# Patient Record
Sex: FEMALE | NOT HISPANIC OR LATINO | URBAN - METROPOLITAN AREA
[De-identification: names, ages, dates, MRNs, and addresses within clinical notes are randomized per-mention and may not be internally consistent; named-entity substitution may affect disease eponyms.]

---

## 2019-03-19 ENCOUNTER — INPATIENT (INPATIENT)
Facility: HOSPITAL | Age: 59
LOS: 2 days | Discharge: HOME | End: 2019-03-22
Attending: INTERNAL MEDICINE | Admitting: INTERNAL MEDICINE
Payer: MEDICAID

## 2019-03-19 VITALS
RESPIRATION RATE: 20 BRPM | OXYGEN SATURATION: 97 % | TEMPERATURE: 99 F | SYSTOLIC BLOOD PRESSURE: 162 MMHG | HEART RATE: 148 BPM | DIASTOLIC BLOOD PRESSURE: 115 MMHG

## 2019-03-19 DIAGNOSIS — Z90.710 ACQUIRED ABSENCE OF BOTH CERVIX AND UTERUS: Chronic | ICD-10-CM

## 2019-03-19 DIAGNOSIS — A41.9 SEPSIS, UNSPECIFIED ORGANISM: ICD-10-CM

## 2019-03-19 LAB
ALBUMIN SERPL ELPH-MCNC: 4.8 G/DL — SIGNIFICANT CHANGE UP (ref 3.3–5)
ALP SERPL-CCNC: 160 U/L — HIGH (ref 40–120)
ALT FLD-CCNC: 20 U/L — SIGNIFICANT CHANGE UP (ref 4–33)
ANION GAP SERPL CALC-SCNC: 17 MMO/L — HIGH (ref 7–14)
APTT BLD: 30.1 SEC — SIGNIFICANT CHANGE UP (ref 27.5–36.3)
AST SERPL-CCNC: 25 U/L — SIGNIFICANT CHANGE UP (ref 4–32)
B PERT DNA SPEC QL NAA+PROBE: NOT DETECTED — SIGNIFICANT CHANGE UP
BASE EXCESS BLDV CALC-SCNC: 1.7 MMOL/L — SIGNIFICANT CHANGE UP
BASOPHILS # BLD AUTO: 0.08 K/UL — SIGNIFICANT CHANGE UP (ref 0–0.2)
BASOPHILS NFR BLD AUTO: 0.3 % — SIGNIFICANT CHANGE UP (ref 0–2)
BASOPHILS NFR SPEC: 0 % — SIGNIFICANT CHANGE UP (ref 0–2)
BILIRUB SERPL-MCNC: 0.6 MG/DL — SIGNIFICANT CHANGE UP (ref 0.2–1.2)
BLASTS # FLD: 0 % — SIGNIFICANT CHANGE UP (ref 0–0)
BLOOD GAS VENOUS - CREATININE: 0.66 MG/DL — SIGNIFICANT CHANGE UP (ref 0.5–1.3)
BUN SERPL-MCNC: 14 MG/DL — SIGNIFICANT CHANGE UP (ref 7–23)
C PNEUM DNA SPEC QL NAA+PROBE: NOT DETECTED — SIGNIFICANT CHANGE UP
CALCIUM SERPL-MCNC: 10.2 MG/DL — SIGNIFICANT CHANGE UP (ref 8.4–10.5)
CHLORIDE BLDV-SCNC: 98 MMOL/L — SIGNIFICANT CHANGE UP (ref 96–108)
CHLORIDE SERPL-SCNC: 95 MMOL/L — LOW (ref 98–107)
CK MB BLD-MCNC: < 1 NG/ML — LOW (ref 1–4.7)
CK MB BLD-MCNC: SIGNIFICANT CHANGE UP (ref 0–2.5)
CK SERPL-CCNC: 69 U/L — SIGNIFICANT CHANGE UP (ref 25–170)
CO2 SERPL-SCNC: 24 MMOL/L — SIGNIFICANT CHANGE UP (ref 22–31)
CREAT SERPL-MCNC: 0.73 MG/DL — SIGNIFICANT CHANGE UP (ref 0.5–1.3)
EOSINOPHIL # BLD AUTO: 0 K/UL — SIGNIFICANT CHANGE UP (ref 0–0.5)
EOSINOPHIL NFR BLD AUTO: 0 % — SIGNIFICANT CHANGE UP (ref 0–6)
EOSINOPHIL NFR FLD: 0 % — SIGNIFICANT CHANGE UP (ref 0–6)
FLUAV H1 2009 PAND RNA SPEC QL NAA+PROBE: NOT DETECTED — SIGNIFICANT CHANGE UP
FLUAV H1 RNA SPEC QL NAA+PROBE: NOT DETECTED — SIGNIFICANT CHANGE UP
FLUAV H3 RNA SPEC QL NAA+PROBE: NOT DETECTED — SIGNIFICANT CHANGE UP
FLUAV SUBTYP SPEC NAA+PROBE: NOT DETECTED — SIGNIFICANT CHANGE UP
FLUBV RNA SPEC QL NAA+PROBE: NOT DETECTED — SIGNIFICANT CHANGE UP
GAS PNL BLDV: 136 MMOL/L — SIGNIFICANT CHANGE UP (ref 136–146)
GLUCOSE BLDV-MCNC: 117 — HIGH (ref 70–99)
GLUCOSE SERPL-MCNC: 118 MG/DL — HIGH (ref 70–99)
HADV DNA SPEC QL NAA+PROBE: NOT DETECTED — SIGNIFICANT CHANGE UP
HCO3 BLDV-SCNC: 25 MMOL/L — SIGNIFICANT CHANGE UP (ref 20–27)
HCOV PNL SPEC NAA+PROBE: SIGNIFICANT CHANGE UP
HCT VFR BLD CALC: 42.9 % — SIGNIFICANT CHANGE UP (ref 34.5–45)
HCT VFR BLDV CALC: 42.6 % — SIGNIFICANT CHANGE UP (ref 34.5–45)
HGB BLD-MCNC: 13.4 G/DL — SIGNIFICANT CHANGE UP (ref 11.5–15.5)
HGB BLDV-MCNC: 13.9 G/DL — SIGNIFICANT CHANGE UP (ref 11.5–15.5)
HMPV RNA SPEC QL NAA+PROBE: NOT DETECTED — SIGNIFICANT CHANGE UP
HPIV1 RNA SPEC QL NAA+PROBE: NOT DETECTED — SIGNIFICANT CHANGE UP
HPIV2 RNA SPEC QL NAA+PROBE: NOT DETECTED — SIGNIFICANT CHANGE UP
HPIV3 RNA SPEC QL NAA+PROBE: NOT DETECTED — SIGNIFICANT CHANGE UP
HPIV4 RNA SPEC QL NAA+PROBE: NOT DETECTED — SIGNIFICANT CHANGE UP
IMM GRANULOCYTES NFR BLD AUTO: 0.8 % — SIGNIFICANT CHANGE UP (ref 0–1.5)
INR BLD: 1.18 — HIGH (ref 0.88–1.17)
LACTATE BLDV-MCNC: 3.6 MMOL/L — HIGH (ref 0.5–2)
LYMPHOCYTES # BLD AUTO: 1.62 K/UL — SIGNIFICANT CHANGE UP (ref 1–3.3)
LYMPHOCYTES # BLD AUTO: 6.5 % — LOW (ref 13–44)
LYMPHOCYTES NFR SPEC AUTO: 3.5 % — LOW (ref 13–44)
MANUAL SMEAR VERIFICATION: SIGNIFICANT CHANGE UP
MCHC RBC-ENTMCNC: 23.8 PG — LOW (ref 27–34)
MCHC RBC-ENTMCNC: 31.2 % — LOW (ref 32–36)
MCV RBC AUTO: 76.2 FL — LOW (ref 80–100)
METAMYELOCYTES # FLD: 0 % — SIGNIFICANT CHANGE UP (ref 0–1)
MONOCYTES # BLD AUTO: 1.38 K/UL — HIGH (ref 0–0.9)
MONOCYTES NFR BLD AUTO: 5.5 % — SIGNIFICANT CHANGE UP (ref 2–14)
MONOCYTES NFR BLD: 4.3 % — SIGNIFICANT CHANGE UP (ref 2–9)
MORPHOLOGY BLD-IMP: NORMAL — SIGNIFICANT CHANGE UP
MYELOCYTES NFR BLD: 0 % — SIGNIFICANT CHANGE UP (ref 0–0)
NEUTROPHIL AB SER-ACNC: 85.3 % — HIGH (ref 43–77)
NEUTROPHILS # BLD AUTO: 21.64 K/UL — HIGH (ref 1.8–7.4)
NEUTROPHILS NFR BLD AUTO: 86.9 % — HIGH (ref 43–77)
NEUTS BAND # BLD: 3.5 % — SIGNIFICANT CHANGE UP (ref 0–6)
NRBC # FLD: 0 K/UL — LOW (ref 25–125)
OTHER - HEMATOLOGY %: 0 — SIGNIFICANT CHANGE UP
PCO2 BLDV: 40 MMHG — LOW (ref 41–51)
PH BLDV: 7.42 PH — SIGNIFICANT CHANGE UP (ref 7.32–7.43)
PLATELET # BLD AUTO: 422 K/UL — HIGH (ref 150–400)
PLATELET COUNT - ESTIMATE: NORMAL — SIGNIFICANT CHANGE UP
PMV BLD: 10.3 FL — SIGNIFICANT CHANGE UP (ref 7–13)
PO2 BLDV: 40 MMHG — SIGNIFICANT CHANGE UP (ref 35–40)
POTASSIUM BLDV-SCNC: 5.7 MMOL/L — HIGH (ref 3.4–4.5)
POTASSIUM SERPL-MCNC: 3.7 MMOL/L — SIGNIFICANT CHANGE UP (ref 3.5–5.3)
POTASSIUM SERPL-SCNC: 3.7 MMOL/L — SIGNIFICANT CHANGE UP (ref 3.5–5.3)
PROMYELOCYTES # FLD: 0 % — SIGNIFICANT CHANGE UP (ref 0–0)
PROT SERPL-MCNC: 9.3 G/DL — HIGH (ref 6–8.3)
PROTHROM AB SERPL-ACNC: 13.2 SEC — HIGH (ref 9.8–13.1)
RBC # BLD: 5.63 M/UL — HIGH (ref 3.8–5.2)
RBC # FLD: 16.5 % — HIGH (ref 10.3–14.5)
RSV RNA SPEC QL NAA+PROBE: NOT DETECTED — SIGNIFICANT CHANGE UP
RV+EV RNA SPEC QL NAA+PROBE: NOT DETECTED — SIGNIFICANT CHANGE UP
SAO2 % BLDV: 75.9 % — SIGNIFICANT CHANGE UP (ref 60–85)
SODIUM SERPL-SCNC: 136 MMOL/L — SIGNIFICANT CHANGE UP (ref 135–145)
TROPONIN T, HIGH SENSITIVITY: < 6 NG/L — SIGNIFICANT CHANGE UP (ref ?–14)
VARIANT LYMPHS # BLD: 3.4 % — SIGNIFICANT CHANGE UP
WBC # BLD: 24.91 K/UL — HIGH (ref 3.8–10.5)
WBC # FLD AUTO: 24.91 K/UL — HIGH (ref 3.8–10.5)

## 2019-03-19 PROCEDURE — 71045 X-RAY EXAM CHEST 1 VIEW: CPT | Mod: 26

## 2019-03-19 RX ORDER — HEPARIN SODIUM 5000 [USP'U]/ML
5000 INJECTION INTRAVENOUS; SUBCUTANEOUS EVERY 12 HOURS
Qty: 0 | Refills: 0 | Status: DISCONTINUED | OUTPATIENT
Start: 2019-03-19 | End: 2019-03-22

## 2019-03-19 RX ORDER — SODIUM CHLORIDE 9 MG/ML
2400 INJECTION INTRAMUSCULAR; INTRAVENOUS; SUBCUTANEOUS ONCE
Qty: 0 | Refills: 0 | Status: COMPLETED | OUTPATIENT
Start: 2019-03-19 | End: 2019-03-19

## 2019-03-19 RX ORDER — VANCOMYCIN HCL 1 G
1000 VIAL (EA) INTRAVENOUS ONCE
Qty: 0 | Refills: 0 | Status: COMPLETED | OUTPATIENT
Start: 2019-03-19 | End: 2019-03-19

## 2019-03-19 RX ORDER — ACETAMINOPHEN 500 MG
1000 TABLET ORAL ONCE
Qty: 0 | Refills: 0 | Status: COMPLETED | OUTPATIENT
Start: 2019-03-19 | End: 2019-03-19

## 2019-03-19 RX ORDER — ONDANSETRON 8 MG/1
4 TABLET, FILM COATED ORAL ONCE
Qty: 0 | Refills: 0 | Status: COMPLETED | OUTPATIENT
Start: 2019-03-19 | End: 2019-03-19

## 2019-03-19 RX ORDER — VANCOMYCIN HCL 1 G
1000 VIAL (EA) INTRAVENOUS EVERY 12 HOURS
Qty: 0 | Refills: 0 | Status: DISCONTINUED | OUTPATIENT
Start: 2019-03-20 | End: 2019-03-21

## 2019-03-19 RX ORDER — PIPERACILLIN AND TAZOBACTAM 4; .5 G/20ML; G/20ML
3.38 INJECTION, POWDER, LYOPHILIZED, FOR SOLUTION INTRAVENOUS ONCE
Qty: 0 | Refills: 0 | Status: COMPLETED | OUTPATIENT
Start: 2019-03-19 | End: 2019-03-19

## 2019-03-19 RX ORDER — SODIUM CHLORIDE 9 MG/ML
1000 INJECTION INTRAMUSCULAR; INTRAVENOUS; SUBCUTANEOUS
Qty: 0 | Refills: 0 | Status: DISCONTINUED | OUTPATIENT
Start: 2019-03-19 | End: 2019-03-22

## 2019-03-19 RX ORDER — ACETAMINOPHEN 500 MG
975 TABLET ORAL ONCE
Qty: 0 | Refills: 0 | Status: COMPLETED | OUTPATIENT
Start: 2019-03-19 | End: 2019-03-19

## 2019-03-19 RX ORDER — SIMVASTATIN 20 MG/1
1 TABLET, FILM COATED ORAL
Qty: 0 | Refills: 0 | COMMUNITY

## 2019-03-19 RX ORDER — ALBUTEROL 90 UG/1
2 AEROSOL, METERED ORAL EVERY 6 HOURS
Qty: 0 | Refills: 0 | Status: DISCONTINUED | OUTPATIENT
Start: 2019-03-19 | End: 2019-03-22

## 2019-03-19 RX ORDER — CARVEDILOL PHOSPHATE 80 MG/1
25 CAPSULE, EXTENDED RELEASE ORAL EVERY 12 HOURS
Qty: 0 | Refills: 0 | Status: DISCONTINUED | OUTPATIENT
Start: 2019-03-19 | End: 2019-03-19

## 2019-03-19 RX ORDER — ALBUTEROL 90 UG/1
2 AEROSOL, METERED ORAL
Qty: 0 | Refills: 0 | COMMUNITY

## 2019-03-19 RX ORDER — PANTOPRAZOLE SODIUM 20 MG/1
1 TABLET, DELAYED RELEASE ORAL
Qty: 0 | Refills: 0 | COMMUNITY

## 2019-03-19 RX ORDER — FAMOTIDINE 10 MG/ML
20 INJECTION INTRAVENOUS
Qty: 0 | Refills: 0 | Status: DISCONTINUED | OUTPATIENT
Start: 2019-03-19 | End: 2019-03-19

## 2019-03-19 RX ORDER — ACETAMINOPHEN 500 MG
650 TABLET ORAL ONCE
Qty: 0 | Refills: 0 | Status: COMPLETED | OUTPATIENT
Start: 2019-03-19 | End: 2019-03-19

## 2019-03-19 RX ORDER — METOPROLOL TARTRATE 50 MG
1 TABLET ORAL
Qty: 0 | Refills: 0 | COMMUNITY

## 2019-03-19 RX ORDER — SIMVASTATIN 20 MG/1
10 TABLET, FILM COATED ORAL AT BEDTIME
Qty: 0 | Refills: 0 | Status: DISCONTINUED | OUTPATIENT
Start: 2019-03-19 | End: 2019-03-22

## 2019-03-19 RX ORDER — ACETAMINOPHEN 500 MG
650 TABLET ORAL EVERY 6 HOURS
Qty: 0 | Refills: 0 | Status: DISCONTINUED | OUTPATIENT
Start: 2019-03-19 | End: 2019-03-22

## 2019-03-19 RX ORDER — VANCOMYCIN HCL 1 G
VIAL (EA) INTRAVENOUS
Qty: 0 | Refills: 0 | Status: DISCONTINUED | OUTPATIENT
Start: 2019-03-19 | End: 2019-03-19

## 2019-03-19 RX ADMIN — Medication 650 MILLIGRAM(S): at 20:13

## 2019-03-19 RX ADMIN — PIPERACILLIN AND TAZOBACTAM 200 GRAM(S): 4; .5 INJECTION, POWDER, LYOPHILIZED, FOR SOLUTION INTRAVENOUS at 16:11

## 2019-03-19 RX ADMIN — ALBUTEROL 2 PUFF(S): 90 AEROSOL, METERED ORAL at 19:17

## 2019-03-19 RX ADMIN — Medication 975 MILLIGRAM(S): at 18:23

## 2019-03-19 RX ADMIN — ONDANSETRON 4 MILLIGRAM(S): 8 TABLET, FILM COATED ORAL at 22:32

## 2019-03-19 RX ADMIN — FAMOTIDINE 20 MILLIGRAM(S): 10 INJECTION INTRAVENOUS at 18:47

## 2019-03-19 RX ADMIN — Medication 250 MILLIGRAM(S): at 14:53

## 2019-03-19 RX ADMIN — Medication 975 MILLIGRAM(S): at 14:40

## 2019-03-19 RX ADMIN — SODIUM CHLORIDE 75 MILLILITER(S): 9 INJECTION INTRAMUSCULAR; INTRAVENOUS; SUBCUTANEOUS at 19:01

## 2019-03-19 RX ADMIN — Medication 650 MILLIGRAM(S): at 18:32

## 2019-03-19 RX ADMIN — SODIUM CHLORIDE 2400 MILLILITER(S): 9 INJECTION INTRAMUSCULAR; INTRAVENOUS; SUBCUTANEOUS at 14:14

## 2019-03-19 NOTE — ED PROVIDER NOTE - PROGRESS NOTE DETAILS
Scott att: EKG signed by me  with st depression v5 v6, suspect demand ischemia. Called charge RN to expedite room placement. Slotted for room 10. PHILLIP notified concern for demand ischemia.

## 2019-03-19 NOTE — ED ADULT NURSE NOTE - NSIMPLEMENTINTERV_GEN_ALL_ED
Implemented All Fall Risk Interventions:  [place] to call system. Call bell, personal items and telephone within reach. Instruct patient to call for assistance. Room bathroom lighting operational. Non-slip footwear when patient is off stretcher. Physically safe environment: no spills, clutter or unnecessary equipment. Stretcher in lowest position, wheels locked, appropriate side rails in place. Provide visual cue, wrist band, yellow gown, etc. Monitor gait and stability. Monitor for mental status changes and reorient to person, place, and time. Review medications for side effects contributing to fall risk. Reinforce activity limits and safety measures with patient and family.

## 2019-03-19 NOTE — ED PROVIDER NOTE - OBJECTIVE STATEMENT
58y female with PMH of HTN, HLD presenting with SOB.  Started about a month ago while in [place], she was treated with an unknown antibiotic, she had some improvement but she was still short of breath.  Return to [place] on the 8th, she saw her PMD a few days ago, was treated with asthma pump and cough syrup.  Yesterday the SOB worsened, she started coughing and she had nausea and vomiting, with fever.  Vomited last this morning after trying to eat.  Denies chest pain, hemoptysis, abdominal pain, rash.  No smoking.

## 2019-03-19 NOTE — H&P ADULT - NSICDXPASTMEDICALHX_GEN_ALL_CORE_FT
PAST MEDICAL HISTORY:  Benign Essential Hypertension On Coreg    Chronic Back Pain     Dyslipidemia On Zocor

## 2019-03-19 NOTE — ED ADULT NURSE REASSESSMENT NOTE - NS ED NURSE REASSESS COMMENT FT1
Pt arrives with sob tachypneic with fever tylenol given ,pt tachycardiac on monitor pts b/p elevated per family has not had medication.  iv placed labs ,fluid and antibiotics started. Pt awaiting further evaluation.

## 2019-03-19 NOTE — H&P ADULT - NSICDXPROBLEM_GEN_ALL_CORE_FT
PROBLEM DIAGNOSES  Problem: Sepsis  Assessment and Plan: Tachycardic, febrile, elevated WBC. Sourse unknown. ID eval called. Cultures and CT of C/A/P ordered

## 2019-03-19 NOTE — H&P ADULT - HISTORY OF PRESENT ILLNESS
58y female with PMH of HTN, HLD presenting with SOB.  Started about a month ago while in [place], she was treated with an unknown antibiotic, she had some improvement but she was still short of breath.  [place] on [place]th, she saw this MD a few days ago, was treated with asthma pump and cough syrup.  Yesterday the SOB worsened, she started coughing and she had nausea and vomiting, with fever.  Vomited last this morning after trying to eat.  Denies chest pain, hemoptysis, abdominal pain, rash.  No smoking.  She has a history of pulm TB, treated in the remote past

## 2019-03-19 NOTE — ED PROVIDER NOTE - CLINICAL SUMMARY MEDICAL DECISION MAKING FREE TEXT BOX
58y female presenting with SOB, tachycardia.  Concern for viral respiratory infection or pneumonia, considering PE if infectious etiology not apparent.  Will get sepsis labs, start fluids and empiric abx, cxr, tylenol and reassess.

## 2019-03-19 NOTE — ED PROVIDER NOTE - ATTENDING CONTRIBUTION TO CARE
58F c/o fever, cough, dyspnea  -as patient has multiple sirs criteria and I have high susp for respiratory infection, patient is septic  -agree with Dr. Gauthier that labs, cultures, iv abx, ivf, acetaminophen are indicated  -cxr, ua, urine culture  -unlikely pe as wells score only 1.5  -I spoke with Dr. Freeman, the patient's PMD, discussed the case with him and he accepted the admission onto his service

## 2019-03-19 NOTE — ED PROVIDER NOTE - CHIEF COMPLAINT
The patient is a 58y Female complaining of multiple medical complaints. The patient is a 58y Female complaining of dyspnea

## 2019-03-19 NOTE — ED ADULT NURSE NOTE - OBJECTIVE STATEMENT
Report received from Lashanda LEW. Pt is a 58 year old female reporting to the ED for SOB. Pt reports SOB for 1 month. Pt has pmh of TB. Pt reports she had recent travel to [place] and returned home march 8th.  Pt reports n/v, cough and chills. Pt is tachycardia and febrile on arrival. PT is AOX4. Pt denies chest pain. Pt reports SOB. Pt coughing, no productive. Pt placed on airborne precaution. Pt respirations are tachypneic. Pt received medication as ordered. Iv placed by Lashanda LEW. Will continue to monitor

## 2019-03-19 NOTE — ED ADULT TRIAGE NOTE - CHIEF COMPLAINT QUOTE
pt amb to triage c/o fever, body aches, N/V since last night, as per daughter states in [place] last week dx w/ "infection" unknown, did not receive any medication, presents from PMD, advised to come to ED for further eval

## 2019-03-19 NOTE — ED ADULT NURSE NOTE - NSSUHOSCREENINGYN_ED_ALL_ED
Patient (s)  given copy of dc instructions and 2 script(s). Patient (s)  verbalized understanding of instructions and script (s). Patient given a current medication reconciliation form and verbalized understanding of their medications. Patient (s) verbalized understanding of the importance of discussing medications with  his or her physician or clinic they will be following up with. Patient alert and oriented and in no acute distress. Patient discharged home ambulatory with self. Yes - the patient is able to be screened

## 2019-03-20 DIAGNOSIS — I10 ESSENTIAL (PRIMARY) HYPERTENSION: ICD-10-CM

## 2019-03-20 DIAGNOSIS — A41.9 SEPSIS, UNSPECIFIED ORGANISM: ICD-10-CM

## 2019-03-20 LAB
ALBUMIN SERPL ELPH-MCNC: 3.5 G/DL — SIGNIFICANT CHANGE UP (ref 3.3–5)
ALP SERPL-CCNC: 117 U/L — SIGNIFICANT CHANGE UP (ref 40–120)
ALT FLD-CCNC: 14 U/L — SIGNIFICANT CHANGE UP (ref 4–33)
ANION GAP SERPL CALC-SCNC: 12 MMO/L — SIGNIFICANT CHANGE UP (ref 7–14)
APPEARANCE UR: CLEAR — SIGNIFICANT CHANGE UP
AST SERPL-CCNC: 17 U/L — SIGNIFICANT CHANGE UP (ref 4–32)
BACTERIA # UR AUTO: NEGATIVE — SIGNIFICANT CHANGE UP
BASOPHILS # BLD AUTO: 0.09 K/UL — SIGNIFICANT CHANGE UP (ref 0–0.2)
BASOPHILS NFR BLD AUTO: 0.4 % — SIGNIFICANT CHANGE UP (ref 0–2)
BILIRUB SERPL-MCNC: 0.6 MG/DL — SIGNIFICANT CHANGE UP (ref 0.2–1.2)
BILIRUB UR-MCNC: NEGATIVE — SIGNIFICANT CHANGE UP
BLOOD UR QL VISUAL: NEGATIVE — SIGNIFICANT CHANGE UP
BUN SERPL-MCNC: 8 MG/DL — SIGNIFICANT CHANGE UP (ref 7–23)
CALCIUM SERPL-MCNC: 8.7 MG/DL — SIGNIFICANT CHANGE UP (ref 8.4–10.5)
CHLORIDE SERPL-SCNC: 101 MMOL/L — SIGNIFICANT CHANGE UP (ref 98–107)
CO2 SERPL-SCNC: 24 MMOL/L — SIGNIFICANT CHANGE UP (ref 22–31)
COLOR SPEC: SIGNIFICANT CHANGE UP
CREAT SERPL-MCNC: 0.67 MG/DL — SIGNIFICANT CHANGE UP (ref 0.5–1.3)
EOSINOPHIL # BLD AUTO: 0.01 K/UL — SIGNIFICANT CHANGE UP (ref 0–0.5)
EOSINOPHIL NFR BLD AUTO: 0 % — SIGNIFICANT CHANGE UP (ref 0–6)
GLUCOSE SERPL-MCNC: 113 MG/DL — HIGH (ref 70–99)
GLUCOSE UR-MCNC: NEGATIVE — SIGNIFICANT CHANGE UP
HCT VFR BLD CALC: 36.6 % — SIGNIFICANT CHANGE UP (ref 34.5–45)
HGB BLD-MCNC: 11.2 G/DL — LOW (ref 11.5–15.5)
HYALINE CASTS # UR AUTO: NEGATIVE — SIGNIFICANT CHANGE UP
IMM GRANULOCYTES NFR BLD AUTO: 0.9 % — SIGNIFICANT CHANGE UP (ref 0–1.5)
KETONES UR-MCNC: NEGATIVE — SIGNIFICANT CHANGE UP
LEUKOCYTE ESTERASE UR-ACNC: NEGATIVE — SIGNIFICANT CHANGE UP
LYMPHOCYTES # BLD AUTO: 12.5 % — LOW (ref 13–44)
LYMPHOCYTES # BLD AUTO: 2.97 K/UL — SIGNIFICANT CHANGE UP (ref 1–3.3)
MCHC RBC-ENTMCNC: 23.7 PG — LOW (ref 27–34)
MCHC RBC-ENTMCNC: 30.6 % — LOW (ref 32–36)
MCV RBC AUTO: 77.4 FL — LOW (ref 80–100)
MONOCYTES # BLD AUTO: 1.38 K/UL — HIGH (ref 0–0.9)
MONOCYTES NFR BLD AUTO: 5.8 % — SIGNIFICANT CHANGE UP (ref 2–14)
NEUTROPHILS # BLD AUTO: 19.16 K/UL — HIGH (ref 1.8–7.4)
NEUTROPHILS NFR BLD AUTO: 80.4 % — HIGH (ref 43–77)
NITRITE UR-MCNC: NEGATIVE — SIGNIFICANT CHANGE UP
NRBC # FLD: 0 K/UL — LOW (ref 25–125)
PH UR: 7 — SIGNIFICANT CHANGE UP (ref 5–8)
PLATELET # BLD AUTO: 330 K/UL — SIGNIFICANT CHANGE UP (ref 150–400)
PMV BLD: 10.1 FL — SIGNIFICANT CHANGE UP (ref 7–13)
POTASSIUM SERPL-MCNC: 3 MMOL/L — LOW (ref 3.5–5.3)
POTASSIUM SERPL-SCNC: 3 MMOL/L — LOW (ref 3.5–5.3)
PROT SERPL-MCNC: 7.7 G/DL — SIGNIFICANT CHANGE UP (ref 6–8.3)
PROT UR-MCNC: 30 — SIGNIFICANT CHANGE UP
RBC # BLD: 4.73 M/UL — SIGNIFICANT CHANGE UP (ref 3.8–5.2)
RBC # FLD: 16.7 % — HIGH (ref 10.3–14.5)
RBC CASTS # UR COMP ASSIST: SIGNIFICANT CHANGE UP (ref 0–?)
SODIUM SERPL-SCNC: 137 MMOL/L — SIGNIFICANT CHANGE UP (ref 135–145)
SP GR SPEC: 1.02 — SIGNIFICANT CHANGE UP (ref 1–1.04)
SPECIMEN SOURCE: SIGNIFICANT CHANGE UP
SPECIMEN SOURCE: SIGNIFICANT CHANGE UP
SQUAMOUS # UR AUTO: SIGNIFICANT CHANGE UP
T3 SERPL-MCNC: 64.9 NG/DL — LOW (ref 80–200)
T4 AB SER-ACNC: 6.42 UG/DL — SIGNIFICANT CHANGE UP (ref 5.1–13)
TSH SERPL-MCNC: 0.63 UIU/ML — SIGNIFICANT CHANGE UP (ref 0.27–4.2)
UROBILINOGEN FLD QL: NORMAL — SIGNIFICANT CHANGE UP
WBC # BLD: 23.82 K/UL — HIGH (ref 3.8–10.5)
WBC # FLD AUTO: 23.82 K/UL — HIGH (ref 3.8–10.5)
WBC UR QL: SIGNIFICANT CHANGE UP (ref 0–?)

## 2019-03-20 PROCEDURE — 70491 CT SOFT TISSUE NECK W/DYE: CPT | Mod: 26

## 2019-03-20 PROCEDURE — 74177 CT ABD & PELVIS W/CONTRAST: CPT | Mod: 26

## 2019-03-20 PROCEDURE — 71275 CT ANGIOGRAPHY CHEST: CPT | Mod: 26

## 2019-03-20 RX ORDER — IBUPROFEN 200 MG
600 TABLET ORAL ONCE
Qty: 0 | Refills: 0 | Status: COMPLETED | OUTPATIENT
Start: 2019-03-20 | End: 2019-03-20

## 2019-03-20 RX ORDER — POTASSIUM CHLORIDE 20 MEQ
10 PACKET (EA) ORAL
Qty: 0 | Refills: 0 | Status: COMPLETED | OUTPATIENT
Start: 2019-03-20 | End: 2019-03-20

## 2019-03-20 RX ORDER — AZITHROMYCIN 500 MG/1
TABLET, FILM COATED ORAL
Qty: 0 | Refills: 0 | Status: DISCONTINUED | OUTPATIENT
Start: 2019-03-20 | End: 2019-03-21

## 2019-03-20 RX ORDER — AZITHROMYCIN 500 MG/1
500 TABLET, FILM COATED ORAL EVERY 24 HOURS
Qty: 0 | Refills: 0 | Status: DISCONTINUED | OUTPATIENT
Start: 2019-03-21 | End: 2019-03-21

## 2019-03-20 RX ORDER — LANOLIN ALCOHOL/MO/W.PET/CERES
5 CREAM (GRAM) TOPICAL AT BEDTIME
Qty: 0 | Refills: 0 | Status: DISCONTINUED | OUTPATIENT
Start: 2019-03-20 | End: 2019-03-20

## 2019-03-20 RX ORDER — AZITHROMYCIN 500 MG/1
500 TABLET, FILM COATED ORAL ONCE
Qty: 0 | Refills: 0 | Status: COMPLETED | OUTPATIENT
Start: 2019-03-20 | End: 2019-03-20

## 2019-03-20 RX ORDER — DEXAMETHASONE 0.5 MG/5ML
4 ELIXIR ORAL ONCE
Qty: 0 | Refills: 0 | Status: COMPLETED | OUTPATIENT
Start: 2019-03-20 | End: 2019-03-20

## 2019-03-20 RX ORDER — PIPERACILLIN AND TAZOBACTAM 4; .5 G/20ML; G/20ML
3.38 INJECTION, POWDER, LYOPHILIZED, FOR SOLUTION INTRAVENOUS EVERY 8 HOURS
Qty: 0 | Refills: 0 | Status: DISCONTINUED | OUTPATIENT
Start: 2019-03-20 | End: 2019-03-22

## 2019-03-20 RX ORDER — LANOLIN ALCOHOL/MO/W.PET/CERES
3 CREAM (GRAM) TOPICAL AT BEDTIME
Qty: 0 | Refills: 0 | Status: DISCONTINUED | OUTPATIENT
Start: 2019-03-20 | End: 2019-03-22

## 2019-03-20 RX ADMIN — Medication 600 MILLIGRAM(S): at 01:33

## 2019-03-20 RX ADMIN — Medication 250 MILLIGRAM(S): at 15:24

## 2019-03-20 RX ADMIN — HEPARIN SODIUM 5000 UNIT(S): 5000 INJECTION INTRAVENOUS; SUBCUTANEOUS at 05:03

## 2019-03-20 RX ADMIN — Medication 250 MILLIGRAM(S): at 00:11

## 2019-03-20 RX ADMIN — PIPERACILLIN AND TAZOBACTAM 25 GRAM(S): 4; .5 INJECTION, POWDER, LYOPHILIZED, FOR SOLUTION INTRAVENOUS at 02:03

## 2019-03-20 RX ADMIN — SODIUM CHLORIDE 75 MILLILITER(S): 9 INJECTION INTRAMUSCULAR; INTRAVENOUS; SUBCUTANEOUS at 13:55

## 2019-03-20 RX ADMIN — SIMVASTATIN 10 MILLIGRAM(S): 20 TABLET, FILM COATED ORAL at 22:39

## 2019-03-20 RX ADMIN — PIPERACILLIN AND TAZOBACTAM 25 GRAM(S): 4; .5 INJECTION, POWDER, LYOPHILIZED, FOR SOLUTION INTRAVENOUS at 10:28

## 2019-03-20 RX ADMIN — Medication 100 MILLIGRAM(S): at 05:03

## 2019-03-20 RX ADMIN — Medication 4 MILLIGRAM(S): at 13:55

## 2019-03-20 RX ADMIN — Medication 3 MILLIGRAM(S): at 22:39

## 2019-03-20 RX ADMIN — Medication 100 MILLIGRAM(S): at 13:55

## 2019-03-20 RX ADMIN — Medication 100 MILLIEQUIVALENT(S): at 17:53

## 2019-03-20 RX ADMIN — Medication 100 MILLIEQUIVALENT(S): at 16:46

## 2019-03-20 RX ADMIN — HEPARIN SODIUM 5000 UNIT(S): 5000 INJECTION INTRAVENOUS; SUBCUTANEOUS at 17:53

## 2019-03-20 RX ADMIN — Medication 100 MILLIEQUIVALENT(S): at 19:51

## 2019-03-20 RX ADMIN — Medication 650 MILLIGRAM(S): at 15:41

## 2019-03-20 RX ADMIN — AZITHROMYCIN 250 MILLIGRAM(S): 500 TABLET, FILM COATED ORAL at 11:30

## 2019-03-20 RX ADMIN — Medication 100 MILLIGRAM(S): at 22:39

## 2019-03-20 RX ADMIN — Medication 400 MILLIGRAM(S): at 00:12

## 2019-03-20 RX ADMIN — Medication 1000 MILLIGRAM(S): at 00:12

## 2019-03-20 RX ADMIN — PIPERACILLIN AND TAZOBACTAM 25 GRAM(S): 4; .5 INJECTION, POWDER, LYOPHILIZED, FOR SOLUTION INTRAVENOUS at 17:53

## 2019-03-20 RX ADMIN — Medication 650 MILLIGRAM(S): at 14:22

## 2019-03-20 RX ADMIN — PIPERACILLIN AND TAZOBACTAM 25 GRAM(S): 4; .5 INJECTION, POWDER, LYOPHILIZED, FOR SOLUTION INTRAVENOUS at 22:39

## 2019-03-20 NOTE — CHART NOTE - NSCHARTNOTEFT_GEN_A_CORE
ENT consult called for pt with difficulty swallowing secretions, pain , submandibular nodes .  ENT will see patient , requesting CT neck with contrast

## 2019-03-20 NOTE — CONSULT NOTE ADULT - SUBJECTIVE AND OBJECTIVE BOX
Patient is a 58y old  Female who presents with a chief complaint of sepsis/fever (20 Mar 2019 09:57)      HPI:  58y female with PMH of HTN, HLD presenting with SOB.  Started about a month ago while in [place], she was treated with an unknown antibiotic, she had some improvement but she was still short of breath.  [place] on [place], she saw this MD a few days ago, was treated with asthma pump and cough syrup.  Yesterday the SOB worsened, she started coughing and she had nausea and vomiting, with fever.  Vomited last this morning after trying to eat.  Denies chest pain, hemoptysis, abdominal pain, rash.  No smoking.  She has a history of pulm TB, treated in the remote past (19 Mar 2019 17:48)    she was treated for tb in 1985: She has sore throat too and says her throat hurts a lot:   She has no asthma but have therese using inhalers for past one month:       ?FOLLOWING PRESENT  [x ] Hx of PE/DVT, [ x] Hx COPD, [ x] Hx of Asthma, x[ ] Hx of Hospitalization, x[ ]  Hx of BiPAP/CPAP use, [ x] Hx of KARSTEN    Allergies    No Known Allergies    Intolerances        PAST MEDICAL & SURGICAL HISTORY:  Chronic Back Pain  Benign Essential Hypertension: On Coreg  Dyslipidemia: On Zocor  H/O: hysterectomy      FAMILY HISTORY:      Social History: [ x ] TOBACCO                  [x  ] ETOH                                 [ x ] IVDA/DRUGS    REVIEW OF SYSTEMS      General:	x    Skin/Breast:x  	  Ophthalmologic:x  	  ENMT:	x    Respiratory and Thorax: sore throat, SOB, fever  	  Cardiovascular:	x    Gastrointestinal:	x    Genitourinary:	x    Musculoskeletal:	x    Neurological:	x    Psychiatric:	x    Hematology/Lymphatics:	x    Endocrine:	x    Allergic/Immunologic:	x    MEDICATIONS  (STANDING):  azithromycin  IVPB      azithromycin  IVPB 500 milliGRAM(s) IV Intermittent once  benzonatate 100 milliGRAM(s) Oral every 8 hours  heparin  Injectable 5000 Unit(s) SubCutaneous every 12 hours  piperacillin/tazobactam IVPB. 3.375 Gram(s) IV Intermittent every 8 hours  simvastatin 10 milliGRAM(s) Oral at bedtime  sodium chloride 0.9%. 1000 milliLiter(s) (75 mL/Hr) IV Continuous <Continuous>  vancomycin  IVPB 1000 milliGRAM(s) IV Intermittent every 12 hours    MEDICATIONS  (PRN):  acetaminophen   Tablet .. 650 milliGRAM(s) Oral every 6 hours PRN Temp greater or equal to 38C (100.4F), Mild Pain (1 - 3)  ALBUTerol    90 MICROgram(s) HFA Inhaler 2 Puff(s) Inhalation every 6 hours PRN Shortness of Breath and/or Wheezing       Vital Signs Last 24 Hrs  T(C): 36.7 (20 Mar 2019 05:00), Max: 39.9 (19 Mar 2019 23:34)  T(F): 98 (20 Mar 2019 05:00), Max: 103.8 (19 Mar 2019 23:34)  HR: 109 (20 Mar 2019 05:00) (109 - 148)  BP: 127/85 (20 Mar 2019 05:00) (124/77 - 183/116)  BP(mean): --  RR: 20 (20 Mar 2019 05:00) (18 - 26)  SpO2: 98% (20 Mar 2019 05:00) (91% - 100%)        I&O's Summary      Physical Exam:   GENERAL: NAD, well-groomed, well-developed  HEENT: MARIIA/   Atraumatic, Normocephalic  ENMT: No tonsillar erythema, exudates, or enlargement; Moist mucous membranes, Good dentition, No lesions  NECK: Supple, No JVD, Normal thyroid  CHEST/LUNG: Clear to auscultation bilaterally  CVS: Regular rate and rhythm; No murmurs, rubs, or gallops  GI: : Soft, Nontender, Nondistended; Bowel sounds present  NERVOUS SYSTEM:  Alert & Oriented X3  EXTREMITIES:  2+ Peripheral Pulses, No clubbing, cyanosis, or edema  LYMPH: No lymphadenopathy noted  SKIN: No rashes or lesions  ENDOCRINOLOGY: No Thyromegaly  PSYCH: Appropriate    Labs:  1.7<40<4>>40<<7.425>>1.7<<3><<4><<5<<409>>  CARDIAC MARKERS ( 19 Mar 2019 14:39 )  x     / x     / 69 u/L / < 1.00 ng/mL / x                                11.2   23.82 )-----------( 330      ( 20 Mar 2019 05:45 )             36.6                         13.4   24.91 )-----------( 422      ( 19 Mar 2019 14:39 )             42.9     03-20    137  |  101  |  8   ----------------------------<  113<H>  3.0<L>   |  24  |  0.67  03-19    136  |  95<L>  |  14  ----------------------------<  118<H>  3.7   |  24  |  0.73    Ca    8.7      20 Mar 2019 05:45  Ca    10.2      19 Mar 2019 14:39    TPro  7.7  /  Alb  3.5  /  TBili  0.6  /  DBili  x   /  AST  17  /  ALT  14  /  AlkPhos  117  03-20  TPro  9.3<H>  /  Alb  4.8  /  TBili  0.6  /  DBili  x   /  AST  25  /  ALT  20  /  AlkPhos  160<H>  03-19    CAPILLARY BLOOD GLUCOSE        LIVER FUNCTIONS - ( 20 Mar 2019 05:45 )  Alb: 3.5 g/dL / Pro: 7.7 g/dL / ALK PHOS: 117 u/L / ALT: 14 u/L / AST: 17 u/L / GGT: x           PT/INR - ( 19 Mar 2019 14:39 )   PT: 13.2 SEC;   INR: 1.18          PTT - ( 19 Mar 2019 14:39 )  PTT:30.1 SEC    D DImer      Studies  Chest X-RAY  CT SCAN Chest   CT Abdomen  Venous Dopplers: LE:   Others    < from: Xray Chest 1 View- PORTABLE-Urgent (03.19.19 @ 14:30) >  PROCEDURE DATE:  Mar 19 2019         INTERPRETATION:  CLINICAL INDICATION: dyspnea    EXAM:  Single frontal chest from 3/19/2019 at 1430. Compared to prior study from   4/27/2016.    IMPRESSION:  Redemonstrated right hemithorax volume loss with right hemidiaphragm   elevation and bilateral apical pleural-parenchymal scarring changes.    Grossly clear remaining lungs. No pleural effusions or pneumothorax.    Stable cardiac and mediastinal silhouettes including superiorly retracted   and distorted hilar shadows.    Trachea midline.          < from: CT Chest w/o Cont (03.24.14 @ 17:08) >    Technique: CT scan of the chest was performed from the thoracic inlet to   the upper abdomen without intravenous contrast. Sagittal, coronal and   axial MIP reformatted images were also provided. Comparison is made to   chest radiograph dated 03/22/2014.    FINDINGS: The thyroid gland is unremarkable. There is no axillary,   mediastinal or hilar adenopathy. The heart and pericardium are normal.   The thoracic aorta and main pulmonary arteries are normal in caliber.   There is no pleural effusion. The tracheobronchial tree is patent.    There is scarring and fibrosis of the right upper lobe with volume loss.   Minimal left apical scarring is also present. Bilateral calcified   granulomas are seen bilaterally. There is no pulmonary mass or   consolidation.    Images of the upper abdomen demonstrates an indeterminate 2.4 x 1.8 cm   right adrenal nodule.    The visualized osseous structures are within normal limits.    IMPRESSION: No pneumonia.   Right upper lobe and scarring and fibrosis with volume loss.  2.4 cm indeterminate right adrenal nodule. MRI can be performed for   further characterization.                POWER RIGGS M.D., ATTENDING RADIOLOGIST  This examination was interpreted on: Mar 24 2014  5:23P.  This document   has been electronically signed. Mar 24 2014  5:38P.          < end of copied text >          RUSSEL SUBRAMANIAN M.D., ATTENDING RADIOLOGIST  This document has been electronically signed. Mar 19 2019  4:20PM        < end of copied text >

## 2019-03-20 NOTE — CONSULT NOTE ADULT - ASSESSMENT
58y female with PMH of HTN, HLD presenting with SOB.  Started about a month ago while in [place], she was treated with an unknown antibiotic, she had some improvement but she was still short of breath.  [place] to [place] on the 8th, she saw this MD a few days ago, was treated with asthma pump and cough syrup.  Yesterday the SOB worsened, she started coughing and she had nausea and vomiting, with fever.  Vomited last this morning after trying to eat.  Denies chest pain, hemoptysis, abdominal pain, rash.  No smoking.  She has a history of pulm TB, treated in the remote past (19 Mar 2019 17:48)    she was treated for tb in 1985: She has sore throat too and says her throat hurts a lot:   She has no asthma but have therese using inhalers for past one month:     1: Fever with sore throat: She had had tuberculosis and was treated for it in 1985: This is acute illness: Less likely tb: her chest x-ray is abnormal with volume loss on the right side as well as pleuroparencymal scarring , which is likely due to previous TB: Now she recently traveled also: Would do cta. She is already oin broad spectrum antibiotics If there is no acute pneumonia, this would probably be viral illness:     2: HTN: Controlled    3: DVT prophlaxis

## 2019-03-20 NOTE — CONSULT NOTE ADULT - SUBJECTIVE AND OBJECTIVE BOX
Patient is a 58y old  Female who presents with a chief complaint of     HPI:    58y female with PMH of HTN, HLD presenting with SOB.  Started about a month ago while in [place], she was treated with an unknown antibiotic, she had some improvement but she was still short of breath.  [place] on [place]th, she saw her MD a few days ago, was treated with asthma pump and cough syrup.  Yesterday the SOB worsened, she started coughing and she had nausea and vomiting, with fever.  Vomited last this morning after trying to eat.  Denies chest pain, hemoptysis, abdominal pain, rash.  No smoking.  She has a history of pulm TB, treated in the remote past (19 Mar 2019 17:48)    ER vitals:  99.3, P 148, /115.  RR 26, 100% supp O2.  WBC 24.9 (85% N).  RVP (-).  Cxr grossly clear lungs.      Tmax 103.8.  Pt with low O2 saturation, and placed on 2L NC, on vanco/zosyn.  Pt is on airborne precautions for r/o TB.    ID consult called for further abx managment.        REVIEW OF SYSTEMS:    CONSTITUTIONAL: No fever, weight loss, or fatigue  EYES: No eye pain, visual disturbances, or discharge  ENMT:  No sore throat  NECK: No pain or stiffness  RESPIRATORY: No cough, wheezing, chills or hemoptysis; No shortness of breath  CARDIOVASCULAR: No chest pain, palpitations, dizziness, or leg swelling  GASTROINTESTINAL: No abdominal or epigastric pain. No nausea, vomiting, or hematemesis; No diarrhea or constipation. No melena or hematochezia.  GENITOURINARY: No dysuria, frequency, hematuria, or incontinence  NEUROLOGICAL: No headaches, memory loss, loss of strength, numbness, or tremors  SKIN: No itching, burning, rashes, or lesions   LYMPH NODES: No enlarged glands  MUSCULOSKELETAL: No joint pain or swelling; No muscle, back, or extremity pain      PAST MEDICAL & SURGICAL HISTORY:  Chronic Back Pain  Benign Essential Hypertension: On Coreg  Dyslipidemia: On Zocor  H/O: hysterectomy      Allergies    No Known Allergies    Intolerances        FAMILY HISTORY:      SOCIAL HISTORY:        MEDICATIONS  (STANDING):  benzonatate 100 milliGRAM(s) Oral every 8 hours  heparin  Injectable 5000 Unit(s) SubCutaneous every 12 hours  piperacillin/tazobactam IVPB. 3.375 Gram(s) IV Intermittent every 8 hours  simvastatin 10 milliGRAM(s) Oral at bedtime  sodium chloride 0.9%. 1000 milliLiter(s) (75 mL/Hr) IV Continuous <Continuous>  vancomycin  IVPB 1000 milliGRAM(s) IV Intermittent every 12 hours    MEDICATIONS  (PRN):  acetaminophen   Tablet .. 650 milliGRAM(s) Oral every 6 hours PRN Temp greater or equal to 38C (100.4F), Mild Pain (1 - 3)  ALBUTerol    90 MICROgram(s) HFA Inhaler 2 Puff(s) Inhalation every 6 hours PRN Shortness of Breath and/or Wheezing      Vital Signs Last 24 Hrs  T(C): 36.7 (20 Mar 2019 05:00), Max: 39.9 (19 Mar 2019 23:34)  T(F): 98 (20 Mar 2019 05:00), Max: 103.8 (19 Mar 2019 23:34)  HR: 109 (20 Mar 2019 05:00) (109 - 148)  BP: 127/85 (20 Mar 2019 05:00) (124/77 - 183/116)  BP(mean): --  RR: 20 (20 Mar 2019 05:00) (18 - 26)  SpO2: 98% (20 Mar 2019 05:00) (91% - 100%)    PHYSICAL EXAM:    GENERAL: NAD, well-groomed  HEAD:  Atraumatic, Normocephalic  EYES: EOMI, PERRLA, conjunctiva and sclera clear  ENMT: No tonsillar erythema, exudates, or enlargement; Moist mucous membranes  NECK: Supple, No JVD  CHEST/LUNG: Clear to percussion bilaterally; No rales, rhonchi, wheezing, or rubs  HEART: Regular rate and rhythm; No murmurs, rubs, or gallops  ABDOMEN: Soft, Nontender, Nondistended; Bowel sounds present  EXTREMITIES:  2+ Peripheral Pulses, No clubbing, cyanosis, or edema  LYMPH: No lymphadenopathy noted  SKIN: No rashes or lesions    LABS:  CBC Full  -  ( 20 Mar 2019 05:45 )  WBC Count : 23.82 K/uL  Hemoglobin : 11.2 g/dL  Hematocrit : 36.6 %  Platelet Count - Automated : 330 K/uL  Mean Cell Volume : 77.4 fL  Mean Cell Hemoglobin : 23.7 pg  Mean Cell Hemoglobin Concentration : 30.6 %  Auto Neutrophil # : 19.16 K/uL  Auto Lymphocyte # : 2.97 K/uL  Auto Monocyte # : 1.38 K/uL  Auto Eosinophil # : 0.01 K/uL  Auto Basophil # : 0.09 K/uL  Auto Neutrophil % : 80.4 %  Auto Lymphocyte % : 12.5 %  Auto Monocyte % : 5.8 %  Auto Eosinophil % : 0.0 %  Auto Basophil % : 0.4 %      03-20    137  |  101  |  8   ----------------------------<  113<H>  3.0<L>   |  24  |  0.67    Ca    8.7      20 Mar 2019 05:45    TPro  7.7  /  Alb  3.5  /  TBili  0.6  /  DBili  x   /  AST  17  /  ALT  14  /  AlkPhos  117  03-20      LIVER FUNCTIONS - ( 20 Mar 2019 05:45 )  Alb: 3.5 g/dL / Pro: 7.7 g/dL / ALK PHOS: 117 u/L / ALT: 14 u/L / AST: 17 u/L / GGT: x                               MICROBIOLOGY:      Rapid Respiratory Viral Panel (03.19.19 @ 15:08)    Adenovirus (RapRVP): Not Detected    Influenza A (RapRVP): Not Detected    Influenza AH1 2009 (RapRVP): Not Detected    Influenza AH1 (RapRVP): Not Detected    Influenza AH3 (RapRVP): Not Detected    Influenza B (RapRVP): Not Detected    Parainfluenza 1 (RapRVP): Not Detected    Parainfluenza 2 (RapRVP): Not Detected    Parainfluenza 3 (RapRVP): Not Detected    Parainfluenza 4 (RapRVP): Not Detected    Resp Syncytial Virus (RapRVP): Not Detected    Chlamydia pneumoniae (RapRVP): Not Detected    Mycoplasma pneumoniae (RapRVP): Not Detected    Entero/Rhinovirus (RapRVP): Not Detected    hMPV (RapRVP): Not Detected    Coronavirus (229E,HKU1,NL63,OC43): Not Detected This Respiratory Panel uses polymerase chain reaction (PCR)  to detect for adenovirus; coronavirus (HKU1, NL63, 229E,  OC43); human metapneumovirus (hMPV); human  enterovirus/rhinovirus (Entero/RV); influenza A; influenza  A/H1: influenza A/H3; influenza A/H1-2009; influenza B;  parainfluenza viruses 1,2,3,4; respiratory syncytial virus;  Mycoplasma pneumoniae; and Chlamydophila pneumoniae.                      RADIOLOGY:    < from: Xray Chest 1 View- PORTABLE-Urgent (03.19.19 @ 14:30) >  IMPRESSION:  Redemonstrated right hemithorax volume loss with right hemidiaphragm   elevation and bilateral apical pleural-parenchymal scarring changes.    Grossly clear remaining lungs. No pleural effusions or pneumothorax.    Stable cardiac and mediastinal silhouettes including superiorly retracted   and distorted hilar shadows.    Trachea midline.    < end of copied text > Patient is a 58y old  Female who presents with a chief complaint of     HPI:    58y female with PMH of HTN, HLD presenting with SOB.  Started about a month ago while in [place], she was treated with an unknown antibiotic, she had some improvement but she was still short of breath.  [place] on [place], she saw her MD a few days ago, was treated with asthma pump and cough syrup.  Yesterday the SOB worsened, she started coughing and she had nausea and vomiting, with fever.  Vomited last this morning after trying to eat.  Denies chest pain, hemoptysis, abdominal pain, rash.  No smoking.  She has a history of pulm TB, treated in the remote past (19 Mar 2019 17:48)    ER vitals:  99.3, P 148, /115.  RR 26, 100% supp O2.  WBC 24.9 (85% N).  RVP (-).  Cxr grossly clear lungs.      Tmax 103.8.  Pt with low O2 saturation, and placed on 2L NC, on vanco/zosyn.  Pt is on airborne precautions for r/o TB.      Pt c/o neck swelling in submandibular areas.  Also having pain and difficulty swallowing food, fluids, and saliva.  c/o mild difficulty breathing due to throat swelling, and change in her voice.  Pt has had prior episodes of tonsillitis and has seen ENT many years ago.  Pt denies cough, hemoptysis, cp, weight loss, night sweats, abd pain, diarrhea, dysuria.  She recently returned from [facility] on 3/8.  While in [facility], pt was treated for an URI, and completed 2 out of the 4 weeks of prescribed antibiotics.      ID consult called for further abx managment.      ([employer] [facility],  ID# [id number])  and spoke to daughter-in-law at bedside.       REVIEW OF SYSTEMS:    CONSTITUTIONAL: No fever, weight loss, or fatigue  EYES: No eye pain, visual disturbances, or discharge  ENMT:  No sore throat  NECK: No pain or stiffness  RESPIRATORY: No cough, wheezing, chills or hemoptysis; No shortness of breath  CARDIOVASCULAR: No chest pain, palpitations, dizziness, or leg swelling  GASTROINTESTINAL: No abdominal or epigastric pain. No nausea, vomiting, or hematemesis; No diarrhea or constipation. No melena or hematochezia.  GENITOURINARY: No dysuria, frequency, hematuria, or incontinence  NEUROLOGICAL: No headaches, memory loss, loss of strength, numbness, or tremors  SKIN: No itching, burning, rashes, or lesions   LYMPH NODES: No enlarged glands  MUSCULOSKELETAL: No joint pain or swelling; No muscle, back, or extremity pain      PAST MEDICAL & SURGICAL HISTORY:  Chronic Back Pain  Benign Essential Hypertension: On Coreg  Dyslipidemia: On Zocor  H/O: hysterectomy      Allergies    No Known Allergies    Intolerances        FAMILY HISTORY:  No pertinent fam hx    SOCIAL HISTORY:    No smoking, ivdu, etoh.  No sick contacts.  Recent travel to [facility]    MEDICATIONS  (STANDING):  benzonatate 100 milliGRAM(s) Oral every 8 hours  heparin  Injectable 5000 Unit(s) SubCutaneous every 12 hours  piperacillin/tazobactam IVPB. 3.375 Gram(s) IV Intermittent every 8 hours  simvastatin 10 milliGRAM(s) Oral at bedtime  sodium chloride 0.9%. 1000 milliLiter(s) (75 mL/Hr) IV Continuous <Continuous>  vancomycin  IVPB 1000 milliGRAM(s) IV Intermittent every 12 hours    MEDICATIONS  (PRN):  acetaminophen   Tablet .. 650 milliGRAM(s) Oral every 6 hours PRN Temp greater or equal to 38C (100.4F), Mild Pain (1 - 3)  ALBUTerol    90 MICROgram(s) HFA Inhaler 2 Puff(s) Inhalation every 6 hours PRN Shortness of Breath and/or Wheezing      Vital Signs Last 24 Hrs  T(C): 36.7 (20 Mar 2019 05:00), Max: 39.9 (19 Mar 2019 23:34)  T(F): 98 (20 Mar 2019 05:00), Max: 103.8 (19 Mar 2019 23:34)  HR: 109 (20 Mar 2019 05:00) (109 - 148)  BP: 127/85 (20 Mar 2019 05:00) (124/77 - 183/116)  BP(mean): --  RR: 20 (20 Mar 2019 05:00) (18 - 26)  SpO2: 98% (20 Mar 2019 05:00) (91% - 100%)    PHYSICAL EXAM:    GENERAL: NAD, well-groomed  HEAD:  Atraumatic, Normocephalic  EYES: EOMI, PERRLA, conjunctiva and sclera clear  ENMT: Unable to visualize posterior pharynx.    NECK: Supple, swelling and tenderness in submandibular area  CHEST/LUNG: Clear to percussion bilaterally; No rales, rhonchi, wheezing, or rubs  HEART: Regular rate and rhythm; No murmurs, rubs, or gallops  ABDOMEN: Soft, Nontender, Nondistended; Bowel sounds present  EXTREMITIES:  2+ Peripheral Pulses, No clubbing, cyanosis, or edema  LYMPH: No lymphadenopathy noted  SKIN: No rashes or lesions    LABS:  CBC Full  -  ( 20 Mar 2019 05:45 )  WBC Count : 23.82 K/uL  Hemoglobin : 11.2 g/dL  Hematocrit : 36.6 %  Platelet Count - Automated : 330 K/uL  Mean Cell Volume : 77.4 fL  Mean Cell Hemoglobin : 23.7 pg  Mean Cell Hemoglobin Concentration : 30.6 %  Auto Neutrophil # : 19.16 K/uL  Auto Lymphocyte # : 2.97 K/uL  Auto Monocyte # : 1.38 K/uL  Auto Eosinophil # : 0.01 K/uL  Auto Basophil # : 0.09 K/uL  Auto Neutrophil % : 80.4 %  Auto Lymphocyte % : 12.5 %  Auto Monocyte % : 5.8 %  Auto Eosinophil % : 0.0 %  Auto Basophil % : 0.4 %      03-20    137  |  101  |  8   ----------------------------<  113<H>  3.0<L>   |  24  |  0.67    Ca    8.7      20 Mar 2019 05:45    TPro  7.7  /  Alb  3.5  /  TBili  0.6  /  DBili  x   /  AST  17  /  ALT  14  /  AlkPhos  117  03-20      LIVER FUNCTIONS - ( 20 Mar 2019 05:45 )  Alb: 3.5 g/dL / Pro: 7.7 g/dL / ALK PHOS: 117 u/L / ALT: 14 u/L / AST: 17 u/L / GGT: x                               MICROBIOLOGY:      Rapid Respiratory Viral Panel (03.19.19 @ 15:08)    Adenovirus (RapRVP): Not Detected    Influenza A (RapRVP): Not Detected    Influenza AH1 2009 (RapRVP): Not Detected    Influenza AH1 (RapRVP): Not Detected    Influenza AH3 (RapRVP): Not Detected    Influenza B (RapRVP): Not Detected    Parainfluenza 1 (RapRVP): Not Detected    Parainfluenza 2 (RapRVP): Not Detected    Parainfluenza 3 (RapRVP): Not Detected    Parainfluenza 4 (RapRVP): Not Detected    Resp Syncytial Virus (RapRVP): Not Detected    Chlamydia pneumoniae (RapRVP): Not Detected    Mycoplasma pneumoniae (RapRVP): Not Detected    Entero/Rhinovirus (RapRVP): Not Detected    hMPV (RapRVP): Not Detected    Coronavirus (229E,HKU1,NL63,OC43): Not Detected This Respiratory Panel uses polymerase chain reaction (PCR)  to detect for adenovirus; coronavirus (HKU1, NL63, 229E,  OC43); human metapneumovirus (hMPV); human  enterovirus/rhinovirus (Entero/RV); influenza A; influenza  A/H1: influenza A/H3; influenza A/H1-2009; influenza B;  parainfluenza viruses 1,2,3,4; respiratory syncytial virus;  Mycoplasma pneumoniae; and Chlamydophila pneumoniae.                      RADIOLOGY:    < from: Xray Chest 1 View- PORTABLE-Urgent (03.19.19 @ 14:30) >  IMPRESSION:  Redemonstrated right hemithorax volume loss with right hemidiaphragm   elevation and bilateral apical pleural-parenchymal scarring changes.    Grossly clear remaining lungs. No pleural effusions or pneumothorax.    Stable cardiac and mediastinal silhouettes including superiorly retracted   and distorted hilar shadows.    Trachea midline.    < end of copied text >

## 2019-03-20 NOTE — CONSULT NOTE ADULT - SUBJECTIVE AND OBJECTIVE BOX
58 year old female with PMH of HTN, HLD presenting with SOB.  Started about a month ago while in [place], she was treated with an unknown antibiotic, she had some improvement but she was still short of breath.  [place] on [place] 8th, she saw her MD a few days ago, was treated with asthma pump and cough syrup.  Yesterday the SOB worsened, she started coughing and she had nausea and vomiting, with fever.  Vomited last this morning after trying to eat.  Denies chest pain, hemoptysis, abdominal pain, rash.  No smoking.    She is flu positive. Has remote pulmonary TB history.    Nonsmoker, no alcohol, no hemoptyiss.      PE:  AVSS  NAD, resting  nares with secretions  Nasopharynx with secretions  tonsils exudative 4+ very large  FOE larynx - epiglottis sharp, ae folds wnl, TVC mobile easily seen, larynx normal  neck soft/flat not tender, submental fat present  Submandible glands normal on palpation, partoids normal on palpation  full neck ROM

## 2019-03-20 NOTE — CHART NOTE - NSCHARTNOTEFT_GEN_A_CORE
ADS NIGHT COVERAGE:    Late note entry    Notified by RN re: 23:00 vitals - pt febrile to 103.8, tachycardic, and desatting on RA. Pt admitted with sepsis and is r/o TB on isolation precautions. Pt seen and examined at bedside. Pt is awake/alert and states that she has a headache and feels SOB. Sputum cultures and blood cx pending. Already on vancomycin and received zosyn x1 loading dose. IV tylenol x1 given. Placed on 2L NC with improvement in O2. Ice packs and cooling blanket ordered.    On 1AM vitals - pt still febrile to 102.2 rectal and tachycardic. Ibuprofen 600mg x1 ordered. Zosyn ordered standing. O2 sat stable. C/w IVF.    Vital Signs Last 24 Hrs  T(C): 39 (20 Mar 2019 01:17), Max: 39.9 (19 Mar 2019 23:34)  T(F): 102.2 (20 Mar 2019 01:17), Max: 103.8 (19 Mar 2019 23:34)  HR: 128 (20 Mar 2019 01:17) (125 - 148)  BP: 124/77 (20 Mar 2019 01:17) (124/77 - 183/116)  RR: 20 (20 Mar 2019 01:17) (18 - 26)  SpO2: 98% (20 Mar 2019 01:17) (91% - 100%)    Will continue to monitor  VERA Pardees PA-C  [id number]

## 2019-03-20 NOTE — CONSULT NOTE ADULT - ASSESSMENT
AP: severe exudative tonsillitis, Flu+. Larynx is fully normal. Odnyophagia likely related to infection.   -Continue antibitoics  -conservative treatment for Flu  -salivary glands all appear normal on examination, will fu CT read  -no acute ORL intervention

## 2019-03-20 NOTE — CONSULT NOTE ADULT - ASSESSMENT
Full consult to follow:    - Cont vanco/zosyn.  Add azithromycin for atypical coverage.    - CT chest    - UA, UCx    - Check pct    - Blood cx x 2      Will follow,    Ibis Oneill  [phone] Full consult to follow:    - Cont vanco/zosyn.  Add azithromycin for atypical coverage.    - CT chest    - UA, UCx    - Check pct    - Blood cx x 2    - AFB sputum x 3, quantiferon  gold, HIV test      Will follow,    Ibis Oneill  [phone] 58y female with PMH of HTN, HLD presenting with SOB.  Started about a month ago while in [place], she was treated with an unknown antibiotic, she had some improvement but she was still short of breath.  [place] on [place] 8th, she saw her MD a few days ago, was treated with asthma pump and cough syrup.  Yesterday the SOB worsened, she started coughing and she had nausea and vomiting, with fever.  Vomited last this morning after trying to eat.  Denies chest pain, hemoptysis, abdominal pain, rash.  No smoking.  She has a history of pulm TB, treated in the remote past (19 Mar 2019 17:48)    ER vitals:  99.3, P 148, /115.  RR 26, 100% supp O2.  WBC 24.9 (85% N).  RVP (-).  Cxr grossly clear lungs.      Tmax 103.8.  Pt with low O2 saturation, and placed on 2L NC, on vanco/zosyn.  Pt is on airborne precautions for r/o TB.      Pt c/o neck swelling in submandibular areas.  Also having pain and difficulty swallowing food, fluids, and saliva.  c/o mild difficulty breathing due to throat swelling, and change in her voice.  Pt has had prior episodes of tonsillitis and has seen ENT many years ago.  Pt denies cough, hemoptysis, cp, weight loss, night sweats, abd pain, diarrhea, dysuria.  She recently returned from [facility] on 3/8.  While in [facility], pt was treated for an URI, and completed 2 out of the 4 weeks of prescribed antibiotics.      ID consult called for further abx managment.      ([employer] [facility],  ID# [id number]) and spoke to daughter in law at bedside.       Recommend:      - Cont vanco/zosyn.  Add azithromycin for atypical coverage.  Pt with neck swelling and tenderness, change of voice, difficulty swallowing, mild sob.  Recommend ENT evaluation to evaluate for epiglottitis.      - CT chest/abd/pelvis ordered.  Will add CT neck to r/o abcess    - UA, UCx    - Check pct    - Blood cx x 2    - AFB sputum x 3,  HIV test      d/w pt and daughter-in-law at bedside.       Will follow,    Ibis Oneill  [phone]

## 2019-03-21 LAB
ANION GAP SERPL CALC-SCNC: 10 MMO/L — SIGNIFICANT CHANGE UP (ref 7–14)
BACTERIA UR CULT: SIGNIFICANT CHANGE UP
BASOPHILS # BLD AUTO: 0.04 K/UL — SIGNIFICANT CHANGE UP (ref 0–0.2)
BASOPHILS NFR BLD AUTO: 0.2 % — SIGNIFICANT CHANGE UP (ref 0–2)
BUN SERPL-MCNC: 7 MG/DL — SIGNIFICANT CHANGE UP (ref 7–23)
CALCIUM SERPL-MCNC: 9.1 MG/DL — SIGNIFICANT CHANGE UP (ref 8.4–10.5)
CHLORIDE SERPL-SCNC: 101 MMOL/L — SIGNIFICANT CHANGE UP (ref 98–107)
CO2 SERPL-SCNC: 25 MMOL/L — SIGNIFICANT CHANGE UP (ref 22–31)
CREAT SERPL-MCNC: 0.66 MG/DL — SIGNIFICANT CHANGE UP (ref 0.5–1.3)
CULTURE - ACID FAST SMEAR CONCENTRATED: SIGNIFICANT CHANGE UP
EOSINOPHIL # BLD AUTO: 0 K/UL — SIGNIFICANT CHANGE UP (ref 0–0.5)
EOSINOPHIL NFR BLD AUTO: 0 % — SIGNIFICANT CHANGE UP (ref 0–6)
GLUCOSE SERPL-MCNC: 122 MG/DL — HIGH (ref 70–99)
HCT VFR BLD CALC: 33.6 % — LOW (ref 34.5–45)
HGB BLD-MCNC: 10.2 G/DL — LOW (ref 11.5–15.5)
IMM GRANULOCYTES NFR BLD AUTO: 0.9 % — SIGNIFICANT CHANGE UP (ref 0–1.5)
L PNEUMO AG UR QL: NEGATIVE — SIGNIFICANT CHANGE UP
LYMPHOCYTES # BLD AUTO: 12.8 % — LOW (ref 13–44)
LYMPHOCYTES # BLD AUTO: 2.99 K/UL — SIGNIFICANT CHANGE UP (ref 1–3.3)
MCHC RBC-ENTMCNC: 23.7 PG — LOW (ref 27–34)
MCHC RBC-ENTMCNC: 30.4 % — LOW (ref 32–36)
MCV RBC AUTO: 78.1 FL — LOW (ref 80–100)
MONOCYTES # BLD AUTO: 1.39 K/UL — HIGH (ref 0–0.9)
MONOCYTES NFR BLD AUTO: 5.9 % — SIGNIFICANT CHANGE UP (ref 2–14)
NEUTROPHILS # BLD AUTO: 18.76 K/UL — HIGH (ref 1.8–7.4)
NEUTROPHILS NFR BLD AUTO: 80.2 % — HIGH (ref 43–77)
NRBC # FLD: 0 K/UL — LOW (ref 25–125)
PLATELET # BLD AUTO: 332 K/UL — SIGNIFICANT CHANGE UP (ref 150–400)
PMV BLD: 10.2 FL — SIGNIFICANT CHANGE UP (ref 7–13)
POTASSIUM SERPL-MCNC: 3.3 MMOL/L — LOW (ref 3.5–5.3)
POTASSIUM SERPL-SCNC: 3.3 MMOL/L — LOW (ref 3.5–5.3)
RBC # BLD: 4.3 M/UL — SIGNIFICANT CHANGE UP (ref 3.8–5.2)
RBC # FLD: 17.2 % — HIGH (ref 10.3–14.5)
SODIUM SERPL-SCNC: 136 MMOL/L — SIGNIFICANT CHANGE UP (ref 135–145)
SPECIMEN SOURCE: SIGNIFICANT CHANGE UP
SPECIMEN SOURCE: SIGNIFICANT CHANGE UP
VANCOMYCIN TROUGH SERPL-MCNC: 9.6 UG/ML — LOW (ref 10–20)
WBC # BLD: 23.38 K/UL — HIGH (ref 3.8–10.5)
WBC # FLD AUTO: 23.38 K/UL — HIGH (ref 3.8–10.5)

## 2019-03-21 RX ORDER — VANCOMYCIN HCL 1 G
1250 VIAL (EA) INTRAVENOUS EVERY 12 HOURS
Qty: 0 | Refills: 0 | Status: DISCONTINUED | OUTPATIENT
Start: 2019-03-21 | End: 2019-03-22

## 2019-03-21 RX ORDER — POTASSIUM CHLORIDE 20 MEQ
20 PACKET (EA) ORAL ONCE
Qty: 0 | Refills: 0 | Status: COMPLETED | OUTPATIENT
Start: 2019-03-21 | End: 2019-03-21

## 2019-03-21 RX ADMIN — Medication 250 MILLIGRAM(S): at 03:24

## 2019-03-21 RX ADMIN — AZITHROMYCIN 250 MILLIGRAM(S): 500 TABLET, FILM COATED ORAL at 12:37

## 2019-03-21 RX ADMIN — Medication 20 MILLIEQUIVALENT(S): at 09:26

## 2019-03-21 RX ADMIN — Medication 250 MILLIGRAM(S): at 14:17

## 2019-03-21 RX ADMIN — SODIUM CHLORIDE 75 MILLILITER(S): 9 INJECTION INTRAMUSCULAR; INTRAVENOUS; SUBCUTANEOUS at 12:25

## 2019-03-21 RX ADMIN — Medication 100 MILLIGRAM(S): at 05:12

## 2019-03-21 RX ADMIN — HEPARIN SODIUM 5000 UNIT(S): 5000 INJECTION INTRAVENOUS; SUBCUTANEOUS at 05:12

## 2019-03-21 RX ADMIN — HEPARIN SODIUM 5000 UNIT(S): 5000 INJECTION INTRAVENOUS; SUBCUTANEOUS at 19:03

## 2019-03-21 RX ADMIN — PIPERACILLIN AND TAZOBACTAM 25 GRAM(S): 4; .5 INJECTION, POWDER, LYOPHILIZED, FOR SOLUTION INTRAVENOUS at 15:33

## 2019-03-21 RX ADMIN — PIPERACILLIN AND TAZOBACTAM 25 GRAM(S): 4; .5 INJECTION, POWDER, LYOPHILIZED, FOR SOLUTION INTRAVENOUS at 05:12

## 2019-03-21 RX ADMIN — Medication 100 MILLIGRAM(S): at 21:39

## 2019-03-21 RX ADMIN — PIPERACILLIN AND TAZOBACTAM 25 GRAM(S): 4; .5 INJECTION, POWDER, LYOPHILIZED, FOR SOLUTION INTRAVENOUS at 21:40

## 2019-03-21 RX ADMIN — SIMVASTATIN 10 MILLIGRAM(S): 20 TABLET, FILM COATED ORAL at 21:40

## 2019-03-21 RX ADMIN — Medication 100 MILLIGRAM(S): at 14:17

## 2019-03-21 RX ADMIN — Medication 3 MILLIGRAM(S): at 21:39

## 2019-03-21 NOTE — PROGRESS NOTE ADULT - ASSESSMENT
58y female with PMH of HTN, HLD presenting with SOB.  Started about a month ago while in [place], she was treated with an unknown antibiotic, she had some improvement but she was still short of breath.  [place] on [place], she saw this MD a few days ago, was treated with asthma pump and cough syrup.  Yesterday the SOB worsened, she started coughing and she had nausea and vomiting, with fever.  Vomited last this morning after trying to eat.  Denies chest pain, hemoptysis, abdominal pain, rash.  No smoking.  She has a history of pulm TB, treated in the remote past (19 Mar 2019 17:48)    she was treated for tb in 1985: She has sore throat too and says her throat hurts a lot:   She has no asthma but have therese using inhalers for past one month:     1: Fever with sore throat: She had had tuberculosis and was treated for it in 1985: This is acute illness: Less likely tb: her chest x-ray is abnormal with volume loss on the right side as well as pleuroparencymal scarring , which is likely due to previous TB: Now she recently traveled also: Would do cta. She is already oin broad spectrum antibiotics If there is no acute pneumonia, this would probably be viral illness:     3/21: There is no abscess: There is no significant change in her ct scan chest from before: Have evidence of OLD TB. She was treated for tb in 1985 and took treatment for 6 months per : This time her symptoms are acute and likely related to Upper airway infection/ inflammation I doubt she has active tuberculosis ? DC Isolation . WBC is still igh and broad spectrum antibiotics to continue: Only minor prominence of tonsils:     2: HTN: Controlled  3/21: Controlled     3: DVT prophlaxis

## 2019-03-21 NOTE — PROGRESS NOTE ADULT - PROBLEM SELECTOR PLAN 1
No source yet. ID FU noted. WBC improving
FU Cultures. ENT evaluation to r/o upper airway disease/ epiglottitis. One dose of decadron 4 mg can be given

## 2019-03-21 NOTE — PROGRESS NOTE ADULT - ASSESSMENT
58y female with PMH of HTN, HLD presenting with SOB.  Started about a month ago while in [place], she was treated with an unknown antibiotic, she had some improvement but she was still short of breath.  [place] on [place] 8th, she saw her MD a few days ago, was treated with asthma pump and cough syrup.  Yesterday the SOB worsened, she started coughing and she had nausea and vomiting, with fever.  Vomited last this morning after trying to eat.  Denies chest pain, hemoptysis, abdominal pain, rash.  No smoking.  She has a history of pulm TB, treated in the remote past (19 Mar 2019 17:48)    ER vitals:  99.3, P 148, /115.  RR 26, 100% supp O2.  WBC 24.9 (85% N).  RVP (-).  Cxr grossly clear lungs.      Tmax 103.8.  Pt with low O2 saturation, and placed on 2L NC, on vanco/zosyn.  Pt is on airborne precautions for r/o TB.      Pt c/o neck swelling in submandibular areas.  Also having pain and difficulty swallowing food, fluids, and saliva.  c/o mild difficulty breathing due to throat swelling, and change in her voice.  Pt has had prior episodes of tonsillitis and has seen ENT many years ago.  Pt denies cough, hemoptysis, cp, weight loss, night sweats, abd pain, diarrhea, dysuria.  She recently returned from [facility] on 3/8.  While in [facility], pt was treated for an URI, and completed 2 out of the 4 weeks of prescribed antibiotics.      ID consult called for further abx managment.      ([employer] [facility],  ID# [id number]) and spoke to daughter in law at bedside.       Recommend:      - Cont vanco/zosyn.  Add azithromycin for atypical coverage.  Pt with neck swelling and tenderness, change of voice, difficulty swallowing, mild sob.   ENT evaluation appreciated - noted to have severe exudative tonsillitis.  CT neck without abscess.   Pt improved today.      - Cont broad spectrum abx for now.    f/u ua, ucx, bcx, pct      - AFB sputum (-) x 1.  No pna or acute findings on CT chest.  ?dc airborne.  Pt without cough/hemoptysis, fever, night sweats.      - HIV test        Will follow,    Ibis Oneill  [phone] 58y female with PMH of HTN, HLD presenting with SOB.  Started about a month ago while in [place], she was treated with an unknown antibiotic, she had some improvement but she was still short of breath.  [place] on [place], she saw her MD a few days ago, was treated with asthma pump and cough syrup.  Yesterday the SOB worsened, she started coughing and she had nausea and vomiting, with fever.  Vomited last this morning after trying to eat.  Denies chest pain, hemoptysis, abdominal pain, rash.  No smoking.  She has a history of pulm TB, treated in the remote past (19 Mar 2019 17:48)    ER vitals:  99.3, P 148, /115.  RR 26, 100% supp O2.  WBC 24.9 (85% N).  RVP (-).  Cxr grossly clear lungs.      Tmax 103.8.  Pt with low O2 saturation, and placed on 2L NC, on vanco/zosyn.  Pt is on airborne precautions for r/o TB.      Pt c/o neck swelling in submandibular areas.  Also having pain and difficulty swallowing food, fluids, and saliva.  c/o mild difficulty breathing due to throat swelling, and change in her voice.  Pt has had prior episodes of tonsillitis and has seen ENT many years ago.  Pt denies cough, hemoptysis, cp, weight loss, night sweats, abd pain, diarrhea, dysuria.  She recently returned from [facility] on 3/8.  While in [facility], pt was treated for an URI, and completed 2 out of the 4 weeks of prescribed antibiotics.      ID consult called for further abx managment.      ([employer] [facility],  ID# [id number]) and spoke to daughter in law at bedside.       Recommend:      - Cont vanco/zosyn.  Add azithromycin for atypical coverage.  Pt with neck swelling and tenderness, change of voice, difficulty swallowing, mild sob.   ENT evaluation appreciated - noted to have severe exudative tonsillitis.  CT neck without abscess.   Pt improved today.      - Cont broad spectrum abx for now.    f/u ua, ucx, bcx, pct      - AFB sputum (-) x 1.  No pna or acute findings on CT chest.  2nd smear sent today, if negative will d/c airborne.    Pt without cough/hemoptysis, fever, night sweats.  Do  not suspect Pulm TB.           Will follow,    Ibis Oneill  [phone] 58y female with PMH of HTN, HLD presenting with SOB.  Started about a month ago while in [place], she was treated with an unknown antibiotic, she had some improvement but she was still short of breath.  [place] on [place], she saw her MD a few days ago, was treated with asthma pump and cough syrup.  Yesterday the SOB worsened, she started coughing and she had nausea and vomiting, with fever.  Vomited last this morning after trying to eat.  Denies chest pain, hemoptysis, abdominal pain, rash.  No smoking.  She has a history of pulm TB, treated in the remote past (19 Mar 2019 17:48)    ER vitals:  99.3, P 148, /115.  RR 26, 100% supp O2.  WBC 24.9 (85% N).  RVP (-).  Cxr grossly clear lungs.      Tmax 103.8.  Pt with low O2 saturation, and placed on 2L NC, on vanco/zosyn.  Pt is on airborne precautions for r/o TB.      Pt c/o neck swelling in submandibular areas.  Also having pain and difficulty swallowing food, fluids, and saliva.  c/o mild difficulty breathing due to throat swelling, and change in her voice.  Pt has had prior episodes of tonsillitis and has seen ENT many years ago.  Pt denies cough, hemoptysis, cp, weight loss, night sweats, abd pain, diarrhea, dysuria.  She recently returned from [facility] on 3/8.  While in [facility], pt was treated for an URI, and completed 2 out of the 4 weeks of prescribed antibiotics.      ID consult called for further abx managment.      ([employer] [facility],  ID# [id number]) and spoke to daughter in law at bedside.       Recommend:      - Cont vanco/zosyn.  Add azithromycin for atypical coverage.  Pt with neck swelling and tenderness, change of voice, difficulty swallowing, mild sob.   ENT evaluation appreciated - noted to have severe exudative tonsillitis.  CT neck without abscess.   Pt improved today.      - Cont broad spectrum abx for now.    f/u ua, ucx, bcx, pct      - AFB sputum (-) x 1.  No pna or acute findings on CT chest.  2nd smear sent today, if negative will d/c airborne.    Pt without cough/hemoptysis, fever, night sweats.  Do  not suspect Pulm TB.     - check HIV test, throat swab for Grp A strep, EBV serologies.           Will follow,    Ibis Oneill  [phone]

## 2019-03-22 ENCOUNTER — TRANSCRIPTION ENCOUNTER (OUTPATIENT)
Age: 59
End: 2019-03-22

## 2019-03-22 VITALS — SYSTOLIC BLOOD PRESSURE: 130 MMHG | DIASTOLIC BLOOD PRESSURE: 92 MMHG

## 2019-03-22 LAB
ALBUMIN SERPL ELPH-MCNC: 3.8 G/DL — SIGNIFICANT CHANGE UP (ref 3.3–5)
ALP SERPL-CCNC: 127 U/L — HIGH (ref 40–120)
ALT FLD-CCNC: 27 U/L — SIGNIFICANT CHANGE UP (ref 4–33)
ANION GAP SERPL CALC-SCNC: 12 MMO/L — SIGNIFICANT CHANGE UP (ref 7–14)
AST SERPL-CCNC: 30 U/L — SIGNIFICANT CHANGE UP (ref 4–32)
BASOPHILS # BLD AUTO: 0.05 K/UL — SIGNIFICANT CHANGE UP (ref 0–0.2)
BASOPHILS NFR BLD AUTO: 0.3 % — SIGNIFICANT CHANGE UP (ref 0–2)
BILIRUB SERPL-MCNC: 0.3 MG/DL — SIGNIFICANT CHANGE UP (ref 0.2–1.2)
BUN SERPL-MCNC: 5 MG/DL — LOW (ref 7–23)
CALCIUM SERPL-MCNC: 9.3 MG/DL — SIGNIFICANT CHANGE UP (ref 8.4–10.5)
CHLORIDE SERPL-SCNC: 102 MMOL/L — SIGNIFICANT CHANGE UP (ref 98–107)
CO2 SERPL-SCNC: 27 MMOL/L — SIGNIFICANT CHANGE UP (ref 22–31)
CREAT SERPL-MCNC: 0.59 MG/DL — SIGNIFICANT CHANGE UP (ref 0.5–1.3)
CULTURE - ACID FAST SMEAR CONCENTRATED: SIGNIFICANT CHANGE UP
EBV EA AB TITR SER IF: POSITIVE — SIGNIFICANT CHANGE UP
EBV EA IGG SER-ACNC: NEGATIVE — SIGNIFICANT CHANGE UP
EBV PATRN SPEC IB-IMP: SIGNIFICANT CHANGE UP
EBV VCA IGG AVIDITY SER QL IA: POSITIVE — SIGNIFICANT CHANGE UP
EBV VCA IGM TITR FLD: NEGATIVE — SIGNIFICANT CHANGE UP
EOSINOPHIL # BLD AUTO: 0.21 K/UL — SIGNIFICANT CHANGE UP (ref 0–0.5)
EOSINOPHIL NFR BLD AUTO: 1.3 % — SIGNIFICANT CHANGE UP (ref 0–6)
GLUCOSE SERPL-MCNC: 113 MG/DL — HIGH (ref 70–99)
HCT VFR BLD CALC: 33.1 % — LOW (ref 34.5–45)
HGB BLD-MCNC: 10.2 G/DL — LOW (ref 11.5–15.5)
HIV 1+2 AB+HIV1 P24 AG SERPL QL IA: SIGNIFICANT CHANGE UP
IMM GRANULOCYTES NFR BLD AUTO: 0.4 % — SIGNIFICANT CHANGE UP (ref 0–1.5)
LYMPHOCYTES # BLD AUTO: 37.8 % — SIGNIFICANT CHANGE UP (ref 13–44)
LYMPHOCYTES # BLD AUTO: 5.93 K/UL — HIGH (ref 1–3.3)
MCHC RBC-ENTMCNC: 23.9 PG — LOW (ref 27–34)
MCHC RBC-ENTMCNC: 30.8 % — LOW (ref 32–36)
MCV RBC AUTO: 77.7 FL — LOW (ref 80–100)
MONOCYTES # BLD AUTO: 0.9 K/UL — SIGNIFICANT CHANGE UP (ref 0–0.9)
MONOCYTES NFR BLD AUTO: 5.7 % — SIGNIFICANT CHANGE UP (ref 2–14)
NEUTROPHILS # BLD AUTO: 8.51 K/UL — HIGH (ref 1.8–7.4)
NEUTROPHILS NFR BLD AUTO: 54.5 % — SIGNIFICANT CHANGE UP (ref 43–77)
NRBC # FLD: 0.02 K/UL — LOW (ref 25–125)
PLATELET # BLD AUTO: 356 K/UL — SIGNIFICANT CHANGE UP (ref 150–400)
PMV BLD: 10 FL — SIGNIFICANT CHANGE UP (ref 7–13)
POTASSIUM SERPL-MCNC: 3.1 MMOL/L — LOW (ref 3.5–5.3)
POTASSIUM SERPL-SCNC: 3.1 MMOL/L — LOW (ref 3.5–5.3)
PROT SERPL-MCNC: 7.7 G/DL — SIGNIFICANT CHANGE UP (ref 6–8.3)
RBC # BLD: 4.26 M/UL — SIGNIFICANT CHANGE UP (ref 3.8–5.2)
RBC # FLD: 17 % — HIGH (ref 10.3–14.5)
SODIUM SERPL-SCNC: 141 MMOL/L — SIGNIFICANT CHANGE UP (ref 135–145)
SPECIMEN SOURCE: SIGNIFICANT CHANGE UP
WBC # BLD: 15.67 K/UL — HIGH (ref 3.8–10.5)
WBC # FLD AUTO: 15.67 K/UL — HIGH (ref 3.8–10.5)

## 2019-03-22 RX ORDER — POTASSIUM CHLORIDE 20 MEQ
40 PACKET (EA) ORAL EVERY 4 HOURS
Qty: 0 | Refills: 0 | Status: COMPLETED | OUTPATIENT
Start: 2019-03-22 | End: 2019-03-22

## 2019-03-22 RX ORDER — CEFUROXIME AXETIL 250 MG
500 TABLET ORAL
Qty: 100 | Refills: 0 | OUTPATIENT
Start: 2019-03-22 | End: 2019-03-31

## 2019-03-22 RX ORDER — METOPROLOL TARTRATE 50 MG
100 TABLET ORAL ONCE
Qty: 0 | Refills: 0 | Status: COMPLETED | OUTPATIENT
Start: 2019-03-22 | End: 2019-03-22

## 2019-03-22 RX ADMIN — Medication 40 MILLIEQUIVALENT(S): at 15:30

## 2019-03-22 RX ADMIN — SODIUM CHLORIDE 75 MILLILITER(S): 9 INJECTION INTRAMUSCULAR; INTRAVENOUS; SUBCUTANEOUS at 05:11

## 2019-03-22 RX ADMIN — Medication 166.67 MILLIGRAM(S): at 05:12

## 2019-03-22 RX ADMIN — Medication 100 MILLIGRAM(S): at 19:46

## 2019-03-22 RX ADMIN — Medication 100 MILLIGRAM(S): at 05:12

## 2019-03-22 RX ADMIN — HEPARIN SODIUM 5000 UNIT(S): 5000 INJECTION INTRAVENOUS; SUBCUTANEOUS at 05:12

## 2019-03-22 RX ADMIN — SODIUM CHLORIDE 75 MILLILITER(S): 9 INJECTION INTRAMUSCULAR; INTRAVENOUS; SUBCUTANEOUS at 09:49

## 2019-03-22 RX ADMIN — Medication 100 MILLIGRAM(S): at 15:30

## 2019-03-22 RX ADMIN — HEPARIN SODIUM 5000 UNIT(S): 5000 INJECTION INTRAVENOUS; SUBCUTANEOUS at 17:58

## 2019-03-22 RX ADMIN — Medication 40 MILLIEQUIVALENT(S): at 09:49

## 2019-03-22 RX ADMIN — PIPERACILLIN AND TAZOBACTAM 25 GRAM(S): 4; .5 INJECTION, POWDER, LYOPHILIZED, FOR SOLUTION INTRAVENOUS at 05:12

## 2019-03-22 RX ADMIN — PIPERACILLIN AND TAZOBACTAM 25 GRAM(S): 4; .5 INJECTION, POWDER, LYOPHILIZED, FOR SOLUTION INTRAVENOUS at 15:31

## 2019-03-22 RX ADMIN — Medication 166.67 MILLIGRAM(S): at 17:58

## 2019-03-22 NOTE — PROGRESS NOTE ADULT - SUBJECTIVE AND OBJECTIVE BOX
Infectious Diseases progress note:    Subjective: Feeling better, throat swelling resolving, no pain, able to swallow better.  No fever/chills/sob/sweats.   at bedside.  Pt wants to go home.     ROS:  CONSTITUTIONAL:  No fever, chills, rigors  CARDIOVASCULAR:  No chest pain or palpitations  RESPIRATORY:   No SOB, cough, dyspnea on exertion.  No wheezing  GASTROINTESTINAL:  No abd pain, N/V, diarrhea/constipation  EXTREMITIES:  No swelling or joint pain  GENITOURINARY:  No burning on urination, increased frequency or urgency.  No flank pain  NEUROLOGIC:  No HA, visual disturbances  SKIN: No rashes    Allergies    No Known Allergies    Intolerances        ANTIBIOTICS/RELEVANT:  antimicrobials  piperacillin/tazobactam IVPB. 3.375 Gram(s) IV Intermittent every 8 hours  vancomycin  IVPB 1250 milliGRAM(s) IV Intermittent every 12 hours    immunologic:    OTHER:  acetaminophen   Tablet .. 650 milliGRAM(s) Oral every 6 hours PRN  ALBUTerol    90 MICROgram(s) HFA Inhaler 2 Puff(s) Inhalation every 6 hours PRN  benzonatate 100 milliGRAM(s) Oral every 8 hours  heparin  Injectable 5000 Unit(s) SubCutaneous every 12 hours  melatonin 3 milliGRAM(s) Oral at bedtime  simvastatin 10 milliGRAM(s) Oral at bedtime  sodium chloride 0.9%. 1000 milliLiter(s) IV Continuous <Continuous>      Objective:  Vital Signs Last 24 Hrs  T(C): 36.7 (22 Mar 2019 17:50), Max: 37 (21 Mar 2019 21:37)  T(F): 98 (22 Mar 2019 17:50), Max: 98.6 (21 Mar 2019 21:37)  HR: 98 (22 Mar 2019 17:50) (90 - 98)  BP: 144/104 (22 Mar 2019 17:52) (120/76 - 144/104)  BP(mean): --  RR: 20 (22 Mar 2019 17:50) (18 - 20)  SpO2: 97% (22 Mar 2019 17:50) (94% - 97%)    PHYSICAL EXAM:  Constitutional:NAD  Eyes:MARIIA, EOMI  Ear/Nose/Throat: no thrush, mucositis.  Moist mucous membranes	  Neck:no JVD, no lymphadenopathy, supple, throat swelling improved  Respiratory: CTA michael  Cardiovascular: S1S2 RRR, no murmurs  Gastrointestinal:soft, nontender,  nondistended (+) BS  Extremities:no e/e/c  Skin:  no rashes, open wounds or ulcerations        LABS:                        10.2   15.67 )-----------( 356      ( 22 Mar 2019 05:36 )             33.1     03-22    141  |  102  |  5<L>  ----------------------------<  113<H>  3.1<L>   |  27  |  0.59    Ca    9.3      22 Mar 2019 05:36    TPro  7.7  /  Alb  3.8  /  TBili  0.3  /  DBili  x   /  AST  30  /  ALT  27  /  AlkPhos  127<H>  03-22                Vancomycin Level, Trough: 9.6 ug/mL (03-21 @ 12:51)              MICROBIOLOGY:    Culture - Acid Fast - Sputum w/Smear . (03.21.19 @ 15:21)    Culture - Acid Fast Smear Concentrated:   AFB SMEAR= NO ACID FAST BACILLI SEEN    Specimen Source: SPUTUM    Culture - Urine (03.20.19 @ 15:47)    Culture - Urine:   NO GROWTH AT 24 HOURS    Specimen Source: URINE MIDSTREAM          RADIOLOGY & ADDITIONAL STUDIES:
Patient is a 58y old  Female who presents with a chief complaint of SOB (22 Mar 2019 12:50)      SUBJECTIVE / OVERNIGHT EVENTS:  Afebrile  Review of Systems:   CONSTITUTIONAL: No fever, weight loss, or fatigue  EYES: No eye pain, visual disturbances, or discharge  ENMT:  No difficulty hearing, tinnitus, vertigo; mild throat pain  NECK: No pain or stiffness  BREASTS: No pain, masses, or nipple discharge  RESPIRATORY: No cough, wheezing, chills or hemoptysis; No shortness of breath  CARDIOVASCULAR: No chest pain, palpitations, dizziness, or leg swelling  GASTROINTESTINAL: No abdominal or epigastric pain. No nausea, vomiting, or hematemesis; No diarrhea or constipation. No melena or hematochezia.  GENITOURINARY: No dysuria, frequency, hematuria, or incontinence  NEUROLOGICAL: No headaches, memory loss, loss of strength, numbness, or tremors  SKIN: No itching, burning, rashes, or lesions   LYMPH NODES: No enlarged glands  ENDOCRINE: No heat or cold intolerance; No hair loss  MUSCULOSKELETAL: No joint pain or swelling; No muscle, back, or extremity pain  PSYCHIATRIC: No depression, anxiety, mood swings, or difficulty sleeping  HEME/LYMPH: No easy bruising, or bleeding gums  ALLERY AND IMMUNOLOGIC: No hives or eczema    MEDICATIONS  (STANDING):  benzonatate 100 milliGRAM(s) Oral every 8 hours  heparin  Injectable 5000 Unit(s) SubCutaneous every 12 hours  melatonin 3 milliGRAM(s) Oral at bedtime  piperacillin/tazobactam IVPB. 3.375 Gram(s) IV Intermittent every 8 hours  potassium chloride    Tablet ER 40 milliEquivalent(s) Oral every 4 hours  simvastatin 10 milliGRAM(s) Oral at bedtime  sodium chloride 0.9%. 1000 milliLiter(s) (75 mL/Hr) IV Continuous <Continuous>  vancomycin  IVPB 1250 milliGRAM(s) IV Intermittent every 12 hours    MEDICATIONS  (PRN):  acetaminophen   Tablet .. 650 milliGRAM(s) Oral every 6 hours PRN Temp greater or equal to 38C (100.4F), Mild Pain (1 - 3)  ALBUTerol    90 MICROgram(s) HFA Inhaler 2 Puff(s) Inhalation every 6 hours PRN Shortness of Breath and/or Wheezing      PHYSICAL EXAM:  Vital Signs Last 24 Hrs  T(C): 36.8 (22 Mar 2019 11:20), Max: 37 (21 Mar 2019 21:37)  T(F): 98.3 (22 Mar 2019 11:20), Max: 98.6 (21 Mar 2019 21:37)  HR: 95 (22 Mar 2019 11:20) (90 - 95)  BP: 136/98 (22 Mar 2019 11:20) (120/76 - 141/90)  BP(mean): --  RR: 20 (22 Mar 2019 11:20) (18 - 20)  SpO2: 94% (22 Mar 2019 11:20) (94% - 94%)  I&O's Summary    21 Mar 2019 07:01  -  22 Mar 2019 07:00  --------------------------------------------------------  IN: 0 mL / OUT: 250 mL / NET: -250 mL      GENERAL: NAD, well-developed  HEAD:  Atraumatic, Normocephalic  EYES: EOMI, PERRLA, conjunctiva and sclera clear  NECK: Supple, No JVD  CHEST/LUNG: Clear to auscultation bilaterally; No wheeze  HEART: Regular rate and rhythm; No murmurs, rubs, or gallops  ABDOMEN: Soft, Nontender, Nondistended; Bowel sounds present  EXTREMITIES:  2+ Peripheral Pulses, No clubbing, cyanosis, or edema  PSYCH: AAOx3  NEUROLOGY: non-focal  SKIN: No rashes or lesions    LABS:  CAPILLARY BLOOD GLUCOSE                              10.2   15.67 )-----------( 356      ( 22 Mar 2019 05:36 )             33.1     03    141  |  102  |  5<L>  ----------------------------<  113<H>  3.1<L>   |  27  |  0.59    Ca    9.3      22 Mar 2019 05:36    TPro  7.7  /  Alb  3.8  /  TBili  0.3  /  DBili  x   /  AST  30  /  ALT  27  /  AlkPhos  127<H>  03-22          Urinalysis Basic - ( 20 Mar 2019 15:30 )    Color: LIGHT YELLOW / Appearance: CLEAR / S.017 / pH: 7.0  Gluc: NEGATIVE / Ketone: NEGATIVE  / Bili: NEGATIVE / Urobili: NORMAL   Blood: NEGATIVE / Protein: 30 / Nitrite: NEGATIVE   Leuk Esterase: NEGATIVE / RBC: 3-5 / WBC 3-5   Sq Epi: FEW / Non Sq Epi: x / Bacteria: NEGATIVE        RADIOLOGY & ADDITIONAL TESTS:    Imaging Personally Reviewed:    Consultant(s) Notes Reviewed:      Care Discussed with Consultants/Other Providers:
Infectious Diseases progress note:    Subjective:  Events noted.  Pt seen by ENT yesterday, noted to have severe exudative tonsillitis on exam.  CT neck without abscess.  No pna on CT chest.  Denies cough, fever/ sweats.  Throat pain and swelling improved today.  Tolerating fluids.  WBC remains elevated.  Daughter in law at bedside.     ROS:  CONSTITUTIONAL:  No fever, chills, rigors  CARDIOVASCULAR:  No chest pain or palpitations  RESPIRATORY:   No SOB, cough, dyspnea on exertion.  No wheezing  GASTROINTESTINAL:  No abd pain, N/V, diarrhea/constipation  EXTREMITIES:  No swelling or joint pain  GENITOURINARY:  No burning on urination, increased frequency or urgency.  No flank pain  NEUROLOGIC:  No HA, visual disturbances  SKIN: No rashes    Allergies    No Known Allergies    Intolerances        ANTIBIOTICS/RELEVANT:  antimicrobials  azithromycin  IVPB      azithromycin  IVPB 500 milliGRAM(s) IV Intermittent every 24 hours  piperacillin/tazobactam IVPB. 3.375 Gram(s) IV Intermittent every 8 hours  vancomycin  IVPB 1000 milliGRAM(s) IV Intermittent every 12 hours    immunologic:    OTHER:  acetaminophen   Tablet .. 650 milliGRAM(s) Oral every 6 hours PRN  ALBUTerol    90 MICROgram(s) HFA Inhaler 2 Puff(s) Inhalation every 6 hours PRN  benzonatate 100 milliGRAM(s) Oral every 8 hours  heparin  Injectable 5000 Unit(s) SubCutaneous every 12 hours  melatonin 3 milliGRAM(s) Oral at bedtime  simvastatin 10 milliGRAM(s) Oral at bedtime  sodium chloride 0.9%. 1000 milliLiter(s) IV Continuous <Continuous>      Objective:  Vital Signs Last 24 Hrs  T(C): 36.3 (21 Mar 2019 12:34), Max: 36.8 (20 Mar 2019 16:45)  T(F): 97.4 (21 Mar 2019 12:34), Max: 98.3 (20 Mar 2019 16:45)  HR: 87 (21 Mar 2019 12:34) (87 - 112)  BP: 133/87 (21 Mar 2019 12:34) (122/86 - 139/92)  BP(mean): --  RR: 18 (21 Mar 2019 12:34) (18 - 20)  SpO2: 95% (21 Mar 2019 12:34) (93% - 95%)    PHYSICAL EXAM:  Constitutional:NAD  Eyes:MARIIA, EOMI  Ear/Nose/Throat: no thrush, mucositis.  Moist mucous membranes	  Neck:no JVD, no lymphadenopathy, supple, swelling improved.  Able to view superior portion of epiglottis on exam.    Respiratory: CTA michael  Cardiovascular: S1S2 RRR, no murmurs  Gastrointestinal:soft, nontender,  nondistended (+) BS  Extremities:no e/e/c  Skin:  no rashes, open wounds or ulcerations        LABS:                        10.2   23.38 )-----------( 332      ( 21 Mar 2019 07:04 )             33.6         136  |  101  |  7   ----------------------------<  122<H>  3.3<L>   |  25  |  0.66    Ca    9.1      21 Mar 2019 07:04    TPro  7.7  /  Alb  3.5  /  TBili  0.6  /  DBili  x   /  AST  17  /  ALT  14  /  AlkPhos  117        Urinalysis Basic - ( 20 Mar 2019 15:30 )    Color: LIGHT YELLOW / Appearance: CLEAR / S.017 / pH: 7.0  Gluc: NEGATIVE / Ketone: NEGATIVE  / Bili: NEGATIVE / Urobili: NORMAL   Blood: NEGATIVE / Protein: 30 / Nitrite: NEGATIVE   Leuk Esterase: NEGATIVE / RBC: 3-5 / WBC 3-5   Sq Epi: FEW / Non Sq Epi: x / Bacteria: NEGATIVE              Vancomycin Level, Trough: 9.6 ug/mL ( @ 12:51)              MICROBIOLOGY:    Culture - Urine (19 @ 15:47)    Culture - Urine:   NO GROWTH AT 24 HOURS    Specimen Source: URINE MIDSTREAM    Culture - Acid Fast - Sputum w/Smear . (19 @ 15:15)    Culture - Acid Fast Smear Concentrated:   AFB SMEAR= NO ACID FAST BACILLI SEEN    Specimen Source: SPUTUM    Culture - Blood (19 @ 14:56)    Culture - Blood:   NO ORGANISMS ISOLATED  NO ORGANISMS ISOLATED AT 48 HRS.    Specimen Source: BLOOD VENOUS    Culture - Blood (19 @ 14:56)    Culture - Blood:   NO ORGANISMS ISOLATED  NO ORGANISMS ISOLATED AT 48 HRS.    Specimen Source: BLOOD PERIPHERAL    Rapid Respiratory Viral Panel (19 @ 15:08)    Adenovirus (RapRVP): Not Detected    Influenza A (RapRVP): Not Detected    Influenza AH1 2009 (RapRVP): Not Detected    Influenza AH1 (RapRVP): Not Detected    Influenza AH3 (RapRVP): Not Detected    Influenza B (RapRVP): Not Detected    Parainfluenza 1 (RapRVP): Not Detected    Parainfluenza 2 (RapRVP): Not Detected    Parainfluenza 3 (RapRVP): Not Detected    Parainfluenza 4 (RapRVP): Not Detected    Resp Syncytial Virus (RapRVP): Not Detected    Chlamydia pneumoniae (RapRVP): Not Detected    Mycoplasma pneumoniae (RapRVP): Not Detected    Entero/Rhinovirus (RapRVP): Not Detected    hMPV (RapRVP): Not Detected    Coronavirus (229E,HKU1,NL63,OC43): Not Detected This Respiratory Panel uses polymerase chain reaction (PCR)  to detect for adenovirus; coronavirus (HKU1, NL63, 229E,  OC43); human metapneumovirus (hMPV); human  enterovirus/rhinovirus (Entero/RV); influenza A; influenza  A/H1: influenza A/H3; influenza A/H1-2009; influenza B;  parainfluenza viruses 1,2,3,4; respiratory syncytial virus;  Mycoplasma pneumoniae; and Chlamydophila pneumoniae.          RADIOLOGY & ADDITIONAL STUDIES:    < from: CT Neck Soft Tissue w/ IV Cont (19 @ 21:50) >  FINDINGS:  Prominence of the bilateral palatine tonsils is unchanged from prior   imaging. The aerodigestive tract otherwise appears intact without nodular   or masslike enhancement abnormality.    Nodes in the neck do not appear pathologic in nature but are slightly   prominent and may be reactive in nature.    The parotid, submandibular and thyroid glands are within normal limits.    The vascular structures demonstrate normal enhancement.    There is no soft tissue fluid collection or mass lesion.    There are no suspicious osteolytic or blastic lesions.    The visualized intracranial and intraorbital compartments fail to   demonstrate abnormal enhancement, or mass effect.    Thelung apices are within normal limits.      IMPRESSION:  No evidence of a soft tissue neck mass.  No abscess.    < end of copied text >          < from: CT Angio Chest w/ IV Cont (19 @ 21:50) >  Comparison: 2014    Tubes/Lines: None    Mediastinum and Heart: Thyroid gland is unremarkable. Aorta and pulmonary   arteries are normal in size. There is no pericardial effusion. No   lymphadenopathy.    Lungs, Pleura, and Airways: Right upper lobe and perihilar architectural   distortion noted with areas of bronchiectasis, without significant change   from prior. No consolidations, edema, effusion or pneumothorax.    There is no pulmonary embolus.    Visualized Abdomen: See separate report    Bones and soft tissues: Degenerative changes noted throughout the spine.   No suspicious osseous lesions.    IMPRESSION:    No pulmonary embolus.    < end of copied text >          < from: CT Abdomen and Pelvis w/ Oral Cont and w/ IV Cont (19 @ 21:50) >  FINDINGS:    LOWER CHEST: Imaging of the lung bases. Calcified granuloma in the left   lower lobe measuring 4 mm. Mild cardiomegaly.    LIVER: Within normal limits.  BILE DUCTS: Normal caliber.  GALLBLADDER: Within normal limits.  SPLEEN: Within normal limits.  PANCREAS: Within normal limits.  ADRENALS: Indeterminate right adrenal nodule measuring 2.3 x 1.9 cm,   unchanged since 3/24/2014, likely adrenal adenoma. Left adrenal gland is   within normal limits.  KIDNEYS/URETERS: Subcentimeter renal lesions too small to characterize,   likely cysts. Right renal cortical scarring.    BLADDER: Withinnormal limits.  REPRODUCTIVE ORGANS: Uterus and adnexa are within normal limits.    BOWEL: No bowel obstruction. Appendix is normal.  PERITONEUM: No ascites.  VESSELS:  Within normal limits.  RETROPERITONEUM: No lymphadenopathy.    ABDOMINAL WALL: Within normal limits.  BONES: Degenerative changes of the spine.    IMPRESSION:     No evidence of acute abnormality in the abdomen and pelvis.    < end of copied text >
Patient is a 58y old  Female who presents with a chief complaint of fever (20 Mar 2019 11:07)      Any change in ROS: pt is dong ok : feels better today :  no chest pain     MEDICATIONS  (STANDING):  azithromycin  IVPB      azithromycin  IVPB 500 milliGRAM(s) IV Intermittent every 24 hours  benzonatate 100 milliGRAM(s) Oral every 8 hours  heparin  Injectable 5000 Unit(s) SubCutaneous every 12 hours  melatonin 3 milliGRAM(s) Oral at bedtime  piperacillin/tazobactam IVPB. 3.375 Gram(s) IV Intermittent every 8 hours  simvastatin 10 milliGRAM(s) Oral at bedtime  sodium chloride 0.9%. 1000 milliLiter(s) (75 mL/Hr) IV Continuous <Continuous>  vancomycin  IVPB 1000 milliGRAM(s) IV Intermittent every 12 hours    MEDICATIONS  (PRN):  acetaminophen   Tablet .. 650 milliGRAM(s) Oral every 6 hours PRN Temp greater or equal to 38C (100.4F), Mild Pain (1 - 3)  ALBUTerol    90 MICROgram(s) HFA Inhaler 2 Puff(s) Inhalation every 6 hours PRN Shortness of Breath and/or Wheezing    Vital Signs Last 24 Hrs  T(C): 36.2 (21 Mar 2019 05:11), Max: 38.1 (20 Mar 2019 14:16)  T(F): 97.2 (21 Mar 2019 05:11), Max: 100.5 (20 Mar 2019 14:16)  HR: 95 (21 Mar 2019 10:27) (95 - 112)  BP: 132/94 (21 Mar 2019 10:27) (122/86 - 139/92)  BP(mean): --  RR: 20 (21 Mar 2019 10:27) (19 - 20)  SpO2: 95% (21 Mar 2019 10:27) (93% - 95%)    I&O's Summary    20 Mar 2019 07:01  -  21 Mar 2019 07:00  --------------------------------------------------------  IN: 0 mL / OUT: 620 mL / NET: -620 mL          Physical Exam:   GENERAL: Obese+  HEENT: MARIIA/   Atraumatic, Normocephalic  ENMT: No tonsillar erythema, exudates, or enlargement; Moist mucous membranes, Good dentition, No lesions  NECK: Supple, No JVD, Normal thyroid  CHEST/LUNG: Clear to auscultaion  CVS: Regular rate and rhythm; No murmurs, rubs, or gallops  GI: : Soft, Nontender, Nondistended; Bowel sounds present  NERVOUS SYSTEM:  Alert & Oriented X3  EXTREMITIES:  2+ Peripheral Pulses, No clubbing, cyanosis, or edema  LYMPH: No lymphadenopathy noted  SKIN: No rashes or lesions  ENDOCRINOLOGY: No Thyromegaly  PSYCH: Appropriate    Labs:  25  CARDIAC MARKERS ( 19 Mar 2019 14:39 )  x     / x     / 69 u/L / < 1.00 ng/mL / x                                10.2   23.38 )-----------( 332      ( 21 Mar 2019 07:04 )             33.6                         11.2   23.82 )-----------( 330      ( 20 Mar 2019 05:45 )             36.6                         13.4   24.91 )-----------( 422      ( 19 Mar 2019 14:39 )             42.9     03-21    136  |  101  |  7   ----------------------------<  122<H>  3.3<L>   |  25  |  0.66  03-20    137  |  101  |  8   ----------------------------<  113<H>  3.0<L>   |  24  |  0.67  19    136  |  95<L>  |  14  ----------------------------<  118<H>  3.7   |  24  |  0.73    Ca    9.1      21 Mar 2019 07:04  Ca    8.7      20 Mar 2019 05:45  Ca    10.2      19 Mar 2019 14:39    TPro  7.7  /  Alb  3.5  /  TBili  0.6  /  DBili  x   /  AST  17  /  ALT  14  /  AlkPhos  117  03-20  TPro  9.3<H>  /  Alb  4.8  /  TBili  0.6  /  DBili  x   /  AST  25  /  ALT  20  /  AlkPhos  160<H>      CAPILLARY BLOOD GLUCOSE          LIVER FUNCTIONS - ( 20 Mar 2019 05:45 )  Alb: 3.5 g/dL / Pro: 7.7 g/dL / ALK PHOS: 117 u/L / ALT: 14 u/L / AST: 17 u/L / GGT: x           PT/INR - ( 19 Mar 2019 14:39 )   PT: 13.2 SEC;   INR: 1.18          PTT - ( 19 Mar 2019 14:39 )  PTT:30.1 SEC  Urinalysis Basic - ( 20 Mar 2019 15:30 )    Color: LIGHT YELLOW / Appearance: CLEAR / S.017 / pH: 7.0  Gluc: NEGATIVE / Ketone: NEGATIVE  / Bili: NEGATIVE / Urobili: NORMAL   Blood: NEGATIVE / Protein: 30 / Nitrite: NEGATIVE   Leuk Esterase: NEGATIVE / RBC: 3-5 / WBC 3-5   Sq Epi: FEW / Non Sq Epi: x / Bacteria: NEGATIVE            RECENT CULTURES:   @ 15:47 URINE MIDSTREAM       < from: CT Neck Soft Tissue w/ IV Cont (19 @ 21:50) >    COMPARISON: None.    FINDINGS:  Prominence of the bilateral palatine tonsils is unchanged from prior   imaging. The aerodigestive tract otherwise appears intact without nodular   or masslike enhancement abnormality.    Nodes in the neck do not appear pathologic in nature but are slightly   prominent and may be reactive in nature.    The parotid, submandibular and thyroid glands are within normal limits.    The vascular structures demonstrate normal enhancement.    There is no soft tissue fluid collection or mass lesion.    There are no suspicious osteolytic or blastic lesions.    The visualized intracranial and intraorbital compartments fail to   demonstrate abnormal enhancement, or mass effect.    Thelung apices are within normal limits.      IMPRESSION:  No evidence of a soft tissue neck mass.  No abscess.            < from: CT Angio Chest w/ IV Cont (19 @ 21:50) >      PROCEDURE DATE:  Mar 20 2019         INTERPRETATION:  Reason for Exam: Shortness of breath. Recent travel    CTA of the chest was performed from the thoracic inlet to the level of   the adrenal glands followingIV contrast injection of  70 cc of Omnipaque   350. No immediate complications were reported.  MIP images were also   created and reviewed.     Comparison: 2014    Tubes/Lines: None    Mediastinum and Heart: Thyroid gland is unremarkable. Aorta and pulmonary   arteries are normal in size. There is no pericardial effusion. No   lymphadenopathy.    Lungs, Pleura, and Airways: Right upper lobe and perihilar architectural   distortion noted with areas of bronchiectasis, without significant change   from prior. No consolidations, edema, effusion or pneumothorax.    There is no pulmonary embolus.    Visualized Abdomen: See separate report    Bones and soft tissues: Degenerative changes noted throughout the spine.   No suspicious osseous lesions.    IMPRESSION:    No pulmonary embolus.                  FARHAT RAMIREZ M.D., ATTENDING RADIOLOGIST  This document has been electronically signed. Mar 21 2019  8:43AM        < end of copied text >        SCARLETT MENDES M.D., ATTENDING RADIOLOGIST  This document has been electronically signed. Mar 21 2019  9:06AM        < end of copied text >             -19 @ 14:56 BLOOD PERIPHERAL                  NO ORGANISMS ISOLATED  NO ORGANISMS ISOLATED AT 24 HOURS        RESPIRATORY CULTURES:          Studies  Chest X-RAY  CT SCAN Chest   Venous Dopplers: LE:   CT Abdomen  Others
Patient is a 58y old  Female who presents with a chief complaint of fever (20 Mar 2019 11:07)      SUBJECTIVE / OVERNIGHT EVENTS:  No SOB or cough. C/o difficulty swallowing even her saliva  Review of Systems:   CONSTITUTIONAL: No , weight loss, or fatigue  EYES: No eye pain, visual disturbances, or discharge  ENMT:  No difficulty hearing, tinnitus, vertigo; No sinus , ++throat pain  NECK: No pain or stiffness  BREASTS: No pain, masses, or nipple discharge  RESPIRATORY: No cough, wheezing, chills or hemoptysis; No shortness of breath  CARDIOVASCULAR: No chest pain, palpitations, dizziness, or leg swelling  GASTROINTESTINAL: No abdominal or epigastric pain. No nausea, vomiting, or hematemesis; No diarrhea or constipation. No melena or hematochezia.  GENITOURINARY: No dysuria, frequency, hematuria, or incontinence  NEUROLOGICAL: No headaches, memory loss, loss of strength, numbness, or tremors  SKIN: No itching, burning, rashes, or lesions   LYMPH NODES: No enlarged glands  ENDOCRINE: No heat or cold intolerance; No hair loss  MUSCULOSKELETAL: No joint pain or swelling; No muscle, back, or extremity pain  PSYCHIATRIC: No depression, anxiety, mood swings, or difficulty sleeping  HEME/LYMPH: No easy bruising, or bleeding gums  ALLERY AND IMMUNOLOGIC: No hives or eczema    MEDICATIONS  (STANDING):  azithromycin  IVPB      benzonatate 100 milliGRAM(s) Oral every 8 hours  heparin  Injectable 5000 Unit(s) SubCutaneous every 12 hours  melatonin 3 milliGRAM(s) Oral at bedtime  piperacillin/tazobactam IVPB. 3.375 Gram(s) IV Intermittent every 8 hours  simvastatin 10 milliGRAM(s) Oral at bedtime  sodium chloride 0.9%. 1000 milliLiter(s) (75 mL/Hr) IV Continuous <Continuous>  vancomycin  IVPB 1000 milliGRAM(s) IV Intermittent every 12 hours    MEDICATIONS  (PRN):  acetaminophen   Tablet .. 650 milliGRAM(s) Oral every 6 hours PRN Temp greater or equal to 38C (100.4F), Mild Pain (1 - 3)  ALBUTerol    90 MICROgram(s) HFA Inhaler 2 Puff(s) Inhalation every 6 hours PRN Shortness of Breath and/or Wheezing      PHYSICAL EXAM:  Vital Signs Last 24 Hrs  T(C): 36.7 (20 Mar 2019 11:27), Max: 39.9 (19 Mar 2019 23:34)  T(F): 98 (20 Mar 2019 11:27), Max: 103.8 (19 Mar 2019 23:34)  HR: 108 (20 Mar 2019 11:27) (108 - 148)  BP: 132/83 (20 Mar 2019 11:27) (124/77 - 183/116)  BP(mean): --  RR: 20 (20 Mar 2019 11:27) (18 - 26)  SpO2: 95% (20 Mar 2019 11:27) (91% - 100%)  I&O's Summary    GENERAL: NAD, well-developed  HEAD:  Atraumatic, Normocephalic  EYES: EOMI, PERRLA, conjunctiva and sclera clear  NECK: Supple, No JVD  CHEST/LUNG: Clear to auscultation bilaterally; No wheeze  HEART: Regular rate and rhythm; No murmurs, rubs, or gallops  ABDOMEN: Soft, Nontender, Nondistended; Bowel sounds present  EXTREMITIES:  2+ Peripheral Pulses, No clubbing, cyanosis, or edema  PSYCH: AAOx3  NEUROLOGY: non-focal  SKIN: No rashes or lesions    LABS:  CAPILLARY BLOOD GLUCOSE                              11.2   23.82 )-----------( 330      ( 20 Mar 2019 05:45 )             36.6     03-20    137  |  101  |  8   ----------------------------<  113<H>  3.0<L>   |  24  |  0.67    Ca    8.7      20 Mar 2019 05:45    TPro  7.7  /  Alb  3.5  /  TBili  0.6  /  DBili  x   /  AST  17  /  ALT  14  /  AlkPhos  117  03-20    PT/INR - ( 19 Mar 2019 14:39 )   PT: 13.2 SEC;   INR: 1.18          PTT - ( 19 Mar 2019 14:39 )  PTT:30.1 SEC  CARDIAC MARKERS ( 19 Mar 2019 14:39 )  x     / x     / 69 u/L / < 1.00 ng/mL / x              RADIOLOGY & ADDITIONAL TESTS:    Imaging Personally Reviewed:    Consultant(s) Notes Reviewed:      Care Discussed with Consultants/Other Providers:
Patient is a 58y old  Female who presents with a chief complaint of sob (21 Mar 2019 16:35)      Any change in ROS: Pt is doing ok : no SOB : no chest pain     MEDICATIONS  (STANDING):  benzonatate 100 milliGRAM(s) Oral every 8 hours  heparin  Injectable 5000 Unit(s) SubCutaneous every 12 hours  melatonin 3 milliGRAM(s) Oral at bedtime  piperacillin/tazobactam IVPB. 3.375 Gram(s) IV Intermittent every 8 hours  potassium chloride    Tablet ER 40 milliEquivalent(s) Oral every 4 hours  simvastatin 10 milliGRAM(s) Oral at bedtime  sodium chloride 0.9%. 1000 milliLiter(s) (75 mL/Hr) IV Continuous <Continuous>  vancomycin  IVPB 1250 milliGRAM(s) IV Intermittent every 12 hours    MEDICATIONS  (PRN):  acetaminophen   Tablet .. 650 milliGRAM(s) Oral every 6 hours PRN Temp greater or equal to 38C (100.4F), Mild Pain (1 - 3)  ALBUTerol    90 MICROgram(s) HFA Inhaler 2 Puff(s) Inhalation every 6 hours PRN Shortness of Breath and/or Wheezing    Vital Signs Last 24 Hrs  T(C): 36.8 (22 Mar 2019 11:20), Max: 37 (21 Mar 2019 21:37)  T(F): 98.3 (22 Mar 2019 11:20), Max: 98.6 (21 Mar 2019 21:37)  HR: 95 (22 Mar 2019 11:20) (90 - 95)  BP: 136/98 (22 Mar 2019 11:20) (120/76 - 141/90)  BP(mean): --  RR: 20 (22 Mar 2019 11:20) (18 - 20)  SpO2: 94% (22 Mar 2019 11:20) (94% - 94%)    I&O's Summary    21 Mar 2019 07:01  -  22 Mar 2019 07:00  --------------------------------------------------------  IN: 0 mL / OUT: 250 mL / NET: -250 mL          Physical Exam:   GENERAL: NAD, well-groomed, well-developed  HEENT: MARIIA/   Atraumatic, Normocephalic  ENMT: No tonsillar erythema, exudates, or enlargement; Moist mucous membranes, Good dentition, No lesions  NECK: Supple, No JVD, Normal thyroid  CHEST/LUNG: Clear to auscultaion, ; No rales, rhonchi, wheezing, or rubs  CVS: Regular rate and rhythm; No murmurs, rubs, or gallops  GI: : Soft, Nontender, Nondistended; Bowel sounds present  NERVOUS SYSTEM:  Alert & Oriented X3  EXTREMITIES:  2+ Peripheral Pulses, No clubbing, cyanosis, or edema  LYMPH: No lymphadenopathy noted  SKIN: No rashes or lesions  ENDOCRINOLOGY: No Thyromegaly  PSYCH: Appropriate    Labs:  25                            10.2   15.67 )-----------( 356      ( 22 Mar 2019 05:36 )             33.1                         10.2   23.38 )-----------( 332      ( 21 Mar 2019 07:04 )             33.6                         11.2   23.82 )-----------( 330      ( 20 Mar 2019 05:45 )             36.6                         13.4   24.91 )-----------( 422      ( 19 Mar 2019 14:39 )             42.9     03-22    141  |  102  |  5<L>  ----------------------------<  113<H>  3.1<L>   |  27  |  0.59  -    136  |  101  |  7   ----------------------------<  122<H>  3.3<L>   |  25  |  0.66  03-20    137  |  101  |  8   ----------------------------<  113<H>  3.0<L>   |  24  |  0.67  -    136  |  95<L>  |  14  ----------------------------<  118<H>  3.7   |  24  |  0.73    Ca    9.3      22 Mar 2019 05:36  Ca    9.1      21 Mar 2019 07:04    TPro  7.7  /  Alb  3.8  /  TBili  0.3  /  DBili  x   /  AST  30  /  ALT  27  /  AlkPhos  127<H>    TPro  7.7  /  Alb  3.5  /  TBili  0.6  /  DBili  x   /  AST  17  /  ALT  14  /  AlkPhos  117    TPro  9.3<H>  /  Alb  4.8  /  TBili  0.6  /  DBili  x   /  AST  25  /  ALT  20  /  AlkPhos  160<H>      CAPILLARY BLOOD GLUCOSE          LIVER FUNCTIONS - ( 22 Mar 2019 05:36 )  Alb: 3.8 g/dL / Pro: 7.7 g/dL / ALK PHOS: 127 u/L / ALT: 27 u/L / AST: 30 u/L / GGT: x             Urinalysis Basic - ( 20 Mar 2019 15:30 )    Color: LIGHT YELLOW / Appearance: CLEAR / S.017 / pH: 7.0  Gluc: NEGATIVE / Ketone: NEGATIVE  / Bili: NEGATIVE / Urobili: NORMAL   Blood: NEGATIVE / Protein: 30 / Nitrite: NEGATIVE   Leuk Esterase: NEGATIVE / RBC: 3-5 / WBC 3-5   Sq Epi: FEW / Non Sq Epi: x / Bacteria: NEGATIVE            RECENT CULTURES:   @ 15:47 URINE MIDSTREAM            < from: CT Neck Soft Tissue w/ IV Cont (19 @ 21:50) >  ncement abnormality.    Nodes in the neck do not appear pathologic in nature but are slightly   prominent and may be reactive in nature.    The parotid, submandibular and thyroid glands are within normal limits.    The vascular structures demonstrate normal enhancement.    There is no soft tissue fluid collection or mass lesion.    There are no suspicious osteolytic or blastic lesions.    The visualized intracranial and intraorbital compartments fail to   demonstrate abnormal enhancement, or mass effect.    Thelung apices are within normal limits.      IMPRESSION:  No evidence of a soft tissue neck mass.  No abscess.      < from: CT Angio Chest w/ IV Cont (19 @ 21:50) >    EXAM:  CT ANGIO CHEST (W)AW IC        PROCEDURE DATE:  Mar 20 2019         INTERPRETATION:  Reason for Exam: Shortness of breath. Recent travel    CTA of the chest was performed from the thoracic inlet to the level of   the adrenal glands followingIV contrast injection of  70 cc of Omnipaque   350. No immediate complications were reported.  MIP images were also   created and reviewed.     Comparison: 2014    Tubes/Lines: None    Mediastinum and Heart: Thyroid gland is unremarkable. Aorta and pulmonary   arteries are normal in size. There is no pericardial effusion. No   lymphadenopathy.    Lungs, Pleura, and Airways: Right upper lobe and perihilar architectural   distortion noted with areas of bronchiectasis, without significant change   from prior. No consolidations, edema, effusion or pneumothorax.    There is no pulmonary embolus.    Visualized Abdomen: See separate report    Bones and soft tissues: Degenerative changes noted throughout the spine.   No suspicious osseous lesions.    IMPRESSION:    No pulmonary embolus.                  FARHAT RAMIREZ M.D., ATTENDING RADIOLOGIST  This document has been electronically signed. Mar 21 2019  8:43AM              < end of copied text >              SCARLETT MENDES M.D., ATTENDING RADIOLOGIST  This document has been electronically signed. Mar 21 2019  9:06AM        < end of copied text >        -20 @ 15:15 SPUTUM                  03-19 @ 14:56 BLOOD PERIPHERAL                  NO ORGANISMS ISOLATED  NO ORGANISMS ISOLATED AT 48 HRS.        RESPIRATORY CULTURES:          Studies  Chest X-RAY  CT SCAN Chest   Venous Dopplers: LE:   CT Abdomen  Others

## 2019-03-22 NOTE — DISCHARGE NOTE PROVIDER - CARE PROVIDER_API CALL
Ibis Oneill)  Infectious Disease; Internal Medicine  [address] [facility] [address]  [place] [address]  Phone: [phone]  Fax: [phone]  Follow Up Time: 2 weeks    Tali Freeman)  Internal Medicine  [address] [place] [address]  Phone: [phone]  Fax: [phone]  Follow Up Time: 1-3 days

## 2019-03-22 NOTE — DISCHARGE NOTE PROVIDER - HOSPITAL COURSE
58y female with PMH of HTN, HLD presenting with SOB.  Started about a month ago while in [place], she was treated with an unknown antibiotic, she had some improvement but she was still short of breath.  [place] to [place] on the 8th, she saw her MD a few days ago, was treated with asthma pump and cough syrup.  Yesterday the SOB worsened, she started coughing and she had nausea and vomiting, with fever.  Vomited last this morning after trying to eat.  Denies chest pain, hemoptysis, abdominal pain, rash.  No smoking.    She has a history of pulm TB, treated in the remote past             - AFB sputum (-) x 2.  No pna or acute findings on CT chest.     Pt without cough/hemoptysis, fever, night sweats.  Do  not suspect Pulm TB.         DC recs: doxy 100mgbid and ceftin 500mg bid x7 days             Sepsis    AFB sputum negative x2    -continue antibiotics as directed     discharge recommendations:  doxycyclin 100mg BID and ceftin 500mg BID both for 10 days    - +EBV    - negative HIV    -legionella negative azithromycin dcd    -Ucx neg    -BCx neg x48h    -strep throat swab pending     - follow up with your primary care doctor within one week if you do not have one you can call the medicine clinic at [phone] to make an appointment        Tonsilitis    severe exudative tonsillitis, Flu+. Larynx is fully normal. Odnyophagia likely related to infection.     -Continue antibitoics    -conservative treatment for Flu    -salivary glands all appear normal on examination    -no acute ORL intervention 58y female with PMH of HTN, HLD presenting with SOB.  Started about a month ago while in [place], she was treated with an unknown antibiotic, she had some improvement but she was still short of breath.  [place] on the 8th, she saw her MD a few days ago, was treated with asthma pump and cough syrup.  Yesterday the SOB worsened, she started coughing and she had nausea and vomiting, with fever.  Vomited last this morning after trying to eat.  Denies chest pain, hemoptysis, abdominal pain, rash.  No smoking.    She has a history of pulm TB, treated in the remote past             - AFB sputum (-) x 2.  No pna or acute findings on CT chest.     Pt without cough/hemoptysis, fever, night sweats.  Do  not suspect Pulm TB.         DC recs: doxy 100mgbid and ceftin 500mg bid x7 days             Sepsis    AFB sputum negative x2    -continue antibiotics as directed     discharge recommendations:  doxycyclin 100mg BID and ceftin 500mg BID both for 10 days    - negative HIV    -legionella negative azithromycin dcd    -Ucx neg    -BCx neg x48h    -strep throat swab pending     - follow up with your primary care doctor within one week if you do not have one you can call the medicine clinic at [phone] to make an appointment        Tonsilitis    severe exudative tonsillitis, Flu+. Larynx is fully normal. Odnyophagia likely related to infection.     -Continue antibitoics    -conservative treatment for Flu    -salivary glands all appear normal on examination    -no acute ORL intervention

## 2019-03-22 NOTE — DISCHARGE NOTE PROVIDER - PROVIDER TOKENS
PROVIDER:[TOKEN:[[id number]],FOLLOWUP:[2 weeks]],PROVIDER:[TOKEN:[[id number]],FOLLOWUP:[1-3 days]]

## 2019-03-22 NOTE — DISCHARGE NOTE NURSING/CASE MANAGEMENT/SOCIAL WORK - NSDCDPATPORTLINK_GEN_ALL_CORE
You can access the [facility] Portal, offered by [facility], by registering with the following website: [email or web address]

## 2019-03-22 NOTE — DISCHARGE NOTE PROVIDER - NSDCCPCAREPLAN_GEN_ALL_CORE_FT
PRINCIPAL DISCHARGE DIAGNOSIS  Diagnosis: Sepsis  Assessment and Plan of Treatment: -AFB sputum negative x2  -continue antibiotics as directed   discharge recommendations:  doxycyclin 100mg BID and ceftin 500mg BID both for 10 days  - +EBV  - negative HIV  -legionella negative azithromycin dcd  -Ucx neg  -BCx neg x48h  -strep throat swab pending   - follow up with your primary care doctor within one week if you do not have one you can call the medicine clinic at [phone] to make an appointment      SECONDARY DISCHARGE DIAGNOSES  Diagnosis: Tonsillitis  Assessment and Plan of Treatment: severe exudative tonsillitis, Flu+. Larynx is fully normal. Odnyophagia likely related to infection.   -Continue antibitoics  -conservative treatment for Flu  -salivary glands all appear normal on examination  -no acute ORL intervention      Diagnosis: Benign Essential Hypertension  Assessment and Plan of Treatment: Benign Essential Hypertension  Low sodium and fat diet, continue anti-hypertensive medications, and follow up with primary care physician.   follow up with your primary care doctor within one week if you do not have one you can call the medicine clinic at [phone] to make an appointment PRINCIPAL DISCHARGE DIAGNOSIS  Diagnosis: Sepsis  Assessment and Plan of Treatment: -AFB sputum negative x2  -continue antibiotics as directed   discharge recommendations:  doxycyclin 100mg BID and ceftin 500mg BID both for 10 days  - negative HIV  -legionella negative azithromycin dcd  -Ucx neg  -BCx neg x48h  -strep throat swab pending   - follow up with your primary care doctor within one week if you do not have one you can call the medicine clinic at [phone] to make an appointment      SECONDARY DISCHARGE DIAGNOSES  Diagnosis: Tonsillitis  Assessment and Plan of Treatment: severe exudative tonsillitis, Flu+. Larynx is fully normal. Odnyophagia likely related to infection.   -Continue antibitoics  -conservative treatment for Flu  -salivary glands all appear normal on examination  -no acute ORL intervention      Diagnosis: Benign Essential Hypertension  Assessment and Plan of Treatment: Benign Essential Hypertension  Low sodium and fat diet, continue anti-hypertensive medications, and follow up with primary care physician.   follow up with your primary care doctor within one week if you do not have one you can call the medicine clinic at [phone] to make an appointment

## 2019-03-22 NOTE — PROGRESS NOTE ADULT - ASSESSMENT
58y female with PMH of HTN, HLD presenting with SOB.  Started about a month ago while in [place], she was treated with an unknown antibiotic, she had some improvement but she was still short of breath.  [place], she saw this MD a few days ago, was treated with asthma pump and cough syrup.  Yesterday the SOB worsened, she started coughing and she had nausea and vomiting, with fever.  Vomited last this morning after trying to eat.  Denies chest pain, hemoptysis, abdominal pain, rash.  No smoking.  She has a history of pulm TB, treated in the remote past (19 Mar 2019 17:48)    she was treated for tb in 1985: She has sore throat too and says her throat hurts a lot:   She has no asthma but have therese using inhalers for past one month:     1: Fever with sore throat: She had had tuberculosis and was treated for it in 1985: This is acute illness: Less likely tb: her chest x-ray is abnormal with volume loss on the right side as well as pleuroparencymal scarring , which is likely due to previous TB: Now she recently traveled also: Would do cta. She is already oin broad spectrum antibiotics If there is no acute pneumonia, this would probably be viral illness:     3/21: There is no abscess: There is no significant change in her ct scan chest from before: Have evidence of OLD TB. She was treated for tb in 1985 and took treatment for 6 months per : This time her symptoms are acute and likely related to Upper airway infection/ inflammation I doubt she has active tuberculosis ? DC Isolation . WBC is still igh and broad spectrum antibiotics to continue: Only minor prominence of tonsils:   3/22: fever has resolved: WBC is decreasing: antibtiocs per ID: no pneumonia? dc isolation    2: HTN: Controlled  3/21: Controlled   3/22: controlled!    3: DVT prophlaxis

## 2019-03-22 NOTE — PROGRESS NOTE ADULT - ASSESSMENT
58y female with PMH of HTN, HLD presenting with SOB.  Started about a month ago while in [place], she was treated with an unknown antibiotic, she had some improvement but she was still short of breath.  [place] on [place], she saw her MD a few days ago, was treated with asthma pump and cough syrup.  Yesterday the SOB worsened, she started coughing and she had nausea and vomiting, with fever.  Vomited last this morning after trying to eat.  Denies chest pain, hemoptysis, abdominal pain, rash.  No smoking.  She has a history of pulm TB, treated in the remote past (19 Mar 2019 17:48)    ER vitals:  99.3, P 148, /115.  RR 26, 100% supp O2.  WBC 24.9 (85% N).  RVP (-).  Cxr grossly clear lungs.      Tmax 103.8.  Pt with low O2 saturation, and placed on 2L NC, on vanco/zosyn.  Pt is on airborne precautions for r/o TB.      Pt c/o neck swelling in submandibular areas.  Also having pain and difficulty swallowing food, fluids, and saliva.  c/o mild difficulty breathing due to throat swelling, and change in her voice.  Pt has had prior episodes of tonsillitis and has seen ENT many years ago.  Pt denies cough, hemoptysis, cp, weight loss, night sweats, abd pain, diarrhea, dysuria.  She recently returned from [facility] on 3/8.  While in [facility], pt was treated for an URI, and completed 2 out of the 4 weeks of prescribed antibiotics.      ID consult called for further abx managment.      ([employer] [facility],  ID# [id number]) and spoke to daughter in law at bedside.       Recommend:      - Cont vanco/zosyn.  Add azithromycin for atypical coverage.  Pt with neck swelling and tenderness, change of voice, difficulty swallowing, mild sob.   ENT evaluation appreciated - noted to have severe exudative tonsillitis.  CT neck without abscess.   Pt clinically much better      - AFB sputum (-) x 2.  No pna or acute findings on CT chest.  Will d/c airborne.     Pt without cough/hemoptysis, fever, night sweats.  Do  not suspect Pulm TB.  Clinical symptoms secondary to tonsillitis.      - check HIV test (-), throat swab for Grp A strep (testing, f/u as outpt - and will cont coverage for strep), EBV serologies (s/o prior exposure).    - d/c iv abx and can switch to doxy 100mg po bid and ceftin 500mg po bid x 10 day course.     OK to d/c from ID standpoint.            Will follow,    Ibis Oneill  [phone]

## 2019-03-23 LAB
CULTURE - ACID FAST SMEAR CONCENTRATED: SIGNIFICANT CHANGE UP
SPECIMEN SOURCE: SIGNIFICANT CHANGE UP
SPECIMEN SOURCE: SIGNIFICANT CHANGE UP

## 2019-03-24 LAB
BACTERIA BLD CULT: SIGNIFICANT CHANGE UP
BACTERIA BLD CULT: SIGNIFICANT CHANGE UP
S PYO SPEC QL CULT: SIGNIFICANT CHANGE UP

## 2019-05-01 LAB — ACID FAST STN SPT: SIGNIFICANT CHANGE UP

## 2019-05-03 LAB — ACID FAST STN SPT: SIGNIFICANT CHANGE UP

## 2019-06-15 LAB — ACID FAST STN SPT: SIGNIFICANT CHANGE UP

## 2020-08-06 NOTE — DISCHARGE NOTE NURSING/CASE MANAGEMENT/SOCIAL WORK - NSDCVIVACCINE_GEN_ALL_CORE_FT
Sensory stimulation at 50hz at 0.5v did not create any pain down the leg. 0.3 cc of 1% lidocaine was injected and radiofrequency lesioning was done for 90 seconds at 80 degree centigrade. Needle pulled out intact. Exact similar procedure was performed at the junction of superior articular process and transverse process at _B L4,L5__ vertebral body levels to burn the __B L3,L4__ medial branches. Paraspinal muscle twitching was seen at _B L3,L4 __ vertebral levels. Patient did not feel radicular pain either during needle placement or during lesioning. Patient tolerated the procedure well. Intravenous sedation was with _100  mcg of Fentanyl and _2__ mg of Versed. ESTIMATED BLOOD LOSS:  Less than 1 cc      Patient was monitored and stable. Discharge instructions were given.
No Vaccines Administered.

## 2022-04-21 ENCOUNTER — APPOINTMENT (OUTPATIENT)
Dept: CARDIOLOGY | Facility: CLINIC | Age: 62
End: 2022-04-21

## 2025-03-05 ENCOUNTER — NON-APPOINTMENT (OUTPATIENT)
Age: 65
End: 2025-03-05